# Patient Record
Sex: FEMALE | Race: WHITE | NOT HISPANIC OR LATINO | Employment: FULL TIME | URBAN - METROPOLITAN AREA
[De-identification: names, ages, dates, MRNs, and addresses within clinical notes are randomized per-mention and may not be internally consistent; named-entity substitution may affect disease eponyms.]

---

## 2018-11-08 ENCOUNTER — APPOINTMENT (OUTPATIENT)
Dept: LAB | Facility: HOSPITAL | Age: 58
End: 2018-11-08
Payer: COMMERCIAL

## 2018-11-08 ENCOUNTER — TRANSCRIBE ORDERS (OUTPATIENT)
Dept: ADMINISTRATIVE | Facility: HOSPITAL | Age: 58
End: 2018-11-08

## 2018-11-08 ENCOUNTER — HOSPITAL ENCOUNTER (OUTPATIENT)
Dept: NON INVASIVE DIAGNOSTICS | Facility: HOSPITAL | Age: 58
Discharge: HOME | End: 2018-11-08
Payer: COMMERCIAL

## 2018-11-08 DIAGNOSIS — R35.0 FREQUENT URINATION: Primary | ICD-10-CM

## 2018-11-08 DIAGNOSIS — R35.0 FREQUENT URINATION: ICD-10-CM

## 2018-11-08 LAB
ANION GAP SERPL CALCULATED.3IONS-SCNC: 6 MMOL/L (ref 4–13)
ATRIAL RATE: 67 BPM
BUN SERPL-MCNC: 13 MG/DL (ref 7–25)
CALCIUM SERPL-MCNC: 9.5 MG/DL (ref 8.6–10.5)
CHLORIDE SERPL-SCNC: 100 MMOL/L (ref 98–107)
CO2 SERPL-SCNC: 28 MMOL/L (ref 21–31)
CREAT SERPL-MCNC: 0.73 MG/DL (ref 0.6–1.2)
ERYTHROCYTE [DISTWIDTH] IN BLOOD BY AUTOMATED COUNT: 12.8 % (ref 11.5–14.5)
GFR SERPL CREATININE-BSD FRML MDRD: 90 ML/MIN/1.73SQ M
GLUCOSE SERPL-MCNC: 97 MG/DL (ref 65–99)
HCT VFR BLD AUTO: 37.3 % (ref 34.8–46.1)
HGB BLD-MCNC: 12.8 G/DL (ref 12–16)
MCH RBC QN AUTO: 30.4 PG (ref 26–34)
MCHC RBC AUTO-ENTMCNC: 34.4 G/DL (ref 31–37)
MCV RBC AUTO: 88 FL (ref 81–99)
P AXIS: 59 DEGREES
PLATELET # BLD AUTO: 279 THOUSANDS/UL (ref 149–390)
PMV BLD AUTO: 8.4 FL (ref 8.6–11.7)
POTASSIUM SERPL-SCNC: 3.8 MMOL/L (ref 3.5–5.5)
PR INTERVAL: 148 MS
QRS AXIS: 28 DEGREES
QRSD INTERVAL: 80 MS
QT INTERVAL: 380 MS
QTC INTERVAL: 401 MS
RBC # BLD AUTO: 4.22 MILLION/UL (ref 3.9–5.2)
SODIUM SERPL-SCNC: 134 MMOL/L (ref 134–143)
T WAVE AXIS: 41 DEGREES
VENTRICULAR RATE: 67 BPM
WBC # BLD AUTO: 6 THOUSAND/UL (ref 4.8–10.8)

## 2018-11-08 PROCEDURE — 93010 ELECTROCARDIOGRAM REPORT: CPT | Performed by: INTERNAL MEDICINE

## 2018-11-08 PROCEDURE — 93005 ELECTROCARDIOGRAM TRACING: CPT

## 2018-11-08 PROCEDURE — 85027 COMPLETE CBC AUTOMATED: CPT

## 2018-11-08 PROCEDURE — 36415 COLL VENOUS BLD VENIPUNCTURE: CPT

## 2018-11-08 PROCEDURE — 80048 BASIC METABOLIC PNL TOTAL CA: CPT

## 2018-12-01 ENCOUNTER — ANESTHESIA EVENT (OUTPATIENT)
Dept: PERIOP | Facility: HOSPITAL | Age: 58
End: 2018-12-01
Payer: COMMERCIAL

## 2018-12-03 ENCOUNTER — ANESTHESIA (OUTPATIENT)
Dept: PERIOP | Facility: HOSPITAL | Age: 58
End: 2018-12-03
Payer: COMMERCIAL

## 2018-12-03 ENCOUNTER — HOSPITAL ENCOUNTER (OUTPATIENT)
Facility: HOSPITAL | Age: 58
Setting detail: OUTPATIENT SURGERY
Discharge: HOME | End: 2018-12-04
Attending: OBSTETRICS & GYNECOLOGY | Admitting: OBSTETRICS & GYNECOLOGY
Payer: COMMERCIAL

## 2018-12-03 DIAGNOSIS — N81.2 INCOMPLETE UTEROVAGINAL PROLAPSE: Primary | ICD-10-CM

## 2018-12-03 DIAGNOSIS — N39.3 SUI (STRESS URINARY INCONTINENCE, FEMALE): ICD-10-CM

## 2018-12-03 PROCEDURE — C2631 REP DEV, URINARY, W/O SLING: HCPCS | Performed by: OBSTETRICS & GYNECOLOGY

## 2018-12-03 PROCEDURE — C1781 MESH (IMPLANTABLE): HCPCS | Performed by: OBSTETRICS & GYNECOLOGY

## 2018-12-03 PROCEDURE — C1771 REP DEV, URINARY, W/SLING: HCPCS | Performed by: OBSTETRICS & GYNECOLOGY

## 2018-12-03 DEVICE — IMPLANTABLE DEVICE
Type: IMPLANTABLE DEVICE | Site: VAGINA | Status: FUNCTIONAL
Brand: ANCHORSURE

## 2018-12-03 DEVICE — DIRECT FIX™ ANTERIOR POLYPROPYLENE MESH
Type: IMPLANTABLE DEVICE | Site: VAGINA | Status: FUNCTIONAL
Brand: RESTORELLE

## 2018-12-03 DEVICE — IMPLANTABLE DEVICE: Type: IMPLANTABLE DEVICE | Site: VAGINA | Status: FUNCTIONAL

## 2018-12-03 RX ORDER — ACETAMINOPHEN 325 MG/1
650 TABLET ORAL EVERY 6 HOURS
Status: DISCONTINUED | OUTPATIENT
Start: 2018-12-03 | End: 2018-12-04 | Stop reason: HOSPADM

## 2018-12-03 RX ORDER — IBUPROFEN 600 MG/1
600 TABLET ORAL EVERY 6 HOURS SCHEDULED
Status: DISCONTINUED | OUTPATIENT
Start: 2018-12-03 | End: 2018-12-04 | Stop reason: HOSPADM

## 2018-12-03 RX ORDER — CEFAZOLIN SODIUM 2 G/50ML
2000 SOLUTION INTRAVENOUS
Status: DISCONTINUED | OUTPATIENT
Start: 2018-12-03 | End: 2018-12-03 | Stop reason: HOSPADM

## 2018-12-03 RX ORDER — ONDANSETRON 2 MG/ML
INJECTION INTRAMUSCULAR; INTRAVENOUS AS NEEDED
Status: DISCONTINUED | OUTPATIENT
Start: 2018-12-03 | End: 2018-12-03 | Stop reason: SURG

## 2018-12-03 RX ORDER — BUPIVACAINE HYDROCHLORIDE AND EPINEPHRINE 5; 5 MG/ML; UG/ML
INJECTION, SOLUTION PERINEURAL AS NEEDED
Status: DISCONTINUED | OUTPATIENT
Start: 2018-12-03 | End: 2018-12-03 | Stop reason: HOSPADM

## 2018-12-03 RX ORDER — ONDANSETRON 2 MG/ML
4 INJECTION INTRAMUSCULAR; INTRAVENOUS EVERY 6 HOURS PRN
Status: DISCONTINUED | OUTPATIENT
Start: 2018-12-03 | End: 2018-12-03 | Stop reason: HOSPADM

## 2018-12-03 RX ORDER — MIDAZOLAM HYDROCHLORIDE 1 MG/ML
INJECTION INTRAMUSCULAR; INTRAVENOUS AS NEEDED
Status: DISCONTINUED | OUTPATIENT
Start: 2018-12-03 | End: 2018-12-03 | Stop reason: SURG

## 2018-12-03 RX ORDER — FENTANYL CITRATE 50 UG/ML
INJECTION, SOLUTION INTRAMUSCULAR; INTRAVENOUS AS NEEDED
Status: DISCONTINUED | OUTPATIENT
Start: 2018-12-03 | End: 2018-12-03 | Stop reason: SURG

## 2018-12-03 RX ORDER — LIDOCAINE HYDROCHLORIDE AND EPINEPHRINE 20; 5 MG/ML; UG/ML
INJECTION, SOLUTION EPIDURAL; INFILTRATION; INTRACAUDAL; PERINEURAL AS NEEDED
Status: DISCONTINUED | OUTPATIENT
Start: 2018-12-03 | End: 2018-12-03 | Stop reason: SURG

## 2018-12-03 RX ORDER — MAGNESIUM HYDROXIDE 1200 MG/15ML
LIQUID ORAL AS NEEDED
Status: DISCONTINUED | OUTPATIENT
Start: 2018-12-03 | End: 2018-12-03 | Stop reason: HOSPADM

## 2018-12-03 RX ORDER — DOCUSATE SODIUM 100 MG/1
100 CAPSULE, LIQUID FILLED ORAL 2 TIMES DAILY
Status: DISCONTINUED | OUTPATIENT
Start: 2018-12-03 | End: 2018-12-04 | Stop reason: HOSPADM

## 2018-12-03 RX ORDER — SODIUM CHLORIDE 9 MG/ML
75 INJECTION, SOLUTION INTRAVENOUS CONTINUOUS
Status: DISCONTINUED | OUTPATIENT
Start: 2018-12-03 | End: 2018-12-04 | Stop reason: HOSPADM

## 2018-12-03 RX ORDER — PROPOFOL 10 MG/ML
INJECTION, EMULSION INTRAVENOUS CONTINUOUS PRN
Status: DISCONTINUED | OUTPATIENT
Start: 2018-12-03 | End: 2018-12-03 | Stop reason: SURG

## 2018-12-03 RX ORDER — SODIUM CHLORIDE 9 MG/ML
125 INJECTION, SOLUTION INTRAVENOUS CONTINUOUS
Status: DISCONTINUED | OUTPATIENT
Start: 2018-12-03 | End: 2018-12-04 | Stop reason: HOSPADM

## 2018-12-03 RX ORDER — PANTOPRAZOLE SODIUM 40 MG/1
40 TABLET, DELAYED RELEASE ORAL
Status: DISCONTINUED | OUTPATIENT
Start: 2018-12-04 | End: 2018-12-04 | Stop reason: HOSPADM

## 2018-12-03 RX ORDER — ONDANSETRON 2 MG/ML
4 INJECTION INTRAMUSCULAR; INTRAVENOUS EVERY 6 HOURS PRN
Status: DISCONTINUED | OUTPATIENT
Start: 2018-12-03 | End: 2018-12-04 | Stop reason: HOSPADM

## 2018-12-03 RX ORDER — FENTANYL CITRATE 50 UG/ML
50 INJECTION, SOLUTION INTRAMUSCULAR; INTRAVENOUS
Status: DISCONTINUED | OUTPATIENT
Start: 2018-12-03 | End: 2018-12-03 | Stop reason: HOSPADM

## 2018-12-03 RX ORDER — PROPOFOL 10 MG/ML
INJECTION, EMULSION INTRAVENOUS AS NEEDED
Status: DISCONTINUED | OUTPATIENT
Start: 2018-12-03 | End: 2018-12-03 | Stop reason: SURG

## 2018-12-03 RX ORDER — FUROSEMIDE 10 MG/ML
INJECTION INTRAMUSCULAR; INTRAVENOUS AS NEEDED
Status: DISCONTINUED | OUTPATIENT
Start: 2018-12-03 | End: 2018-12-03 | Stop reason: SURG

## 2018-12-03 RX ADMIN — CEFAZOLIN SODIUM 2000 MG: 2 SOLUTION INTRAVENOUS at 12:10

## 2018-12-03 RX ADMIN — ONDANSETRON HYDROCHLORIDE 4 MG: 2 INJECTION, SOLUTION INTRAVENOUS at 12:05

## 2018-12-03 RX ADMIN — SODIUM CHLORIDE 125 ML/HR: 0.9 INJECTION, SOLUTION INTRAVENOUS at 08:44

## 2018-12-03 RX ADMIN — LIDOCAINE HYDROCHLORIDE AND EPINEPHRINE 5 ML: 20; 5 INJECTION, SOLUTION EPIDURAL; INFILTRATION; INTRACAUDAL; PERINEURAL at 11:40

## 2018-12-03 RX ADMIN — FUROSEMIDE 10 MG: 10 INJECTION, SOLUTION INTRAMUSCULAR; INTRAVENOUS at 13:58

## 2018-12-03 RX ADMIN — LIDOCAINE HYDROCHLORIDE AND EPINEPHRINE 5 ML: 20; 5 INJECTION, SOLUTION EPIDURAL; INFILTRATION; INTRACAUDAL; PERINEURAL at 13:40

## 2018-12-03 RX ADMIN — SODIUM CHLORIDE 125 ML/HR: 0.9 INJECTION, SOLUTION INTRAVENOUS at 11:25

## 2018-12-03 RX ADMIN — IBUPROFEN 600 MG: 600 TABLET ORAL at 23:57

## 2018-12-03 RX ADMIN — LIDOCAINE HYDROCHLORIDE AND EPINEPHRINE 5 ML: 20; 5 INJECTION, SOLUTION EPIDURAL; INFILTRATION; INTRACAUDAL; PERINEURAL at 11:37

## 2018-12-03 RX ADMIN — SODIUM CHLORIDE 75 ML/HR: 0.9 INJECTION, SOLUTION INTRAVENOUS at 23:59

## 2018-12-03 RX ADMIN — SODIUM CHLORIDE: 0.9 INJECTION, SOLUTION INTRAVENOUS at 12:54

## 2018-12-03 RX ADMIN — ACETAMINOPHEN 650 MG: 325 TABLET, FILM COATED ORAL at 22:19

## 2018-12-03 RX ADMIN — PROPOFOL 80 MCG/KG/MIN: 10 INJECTION, EMULSION INTRAVENOUS at 12:10

## 2018-12-03 RX ADMIN — SODIUM CHLORIDE: 0.9 INJECTION, SOLUTION INTRAVENOUS at 14:36

## 2018-12-03 RX ADMIN — MIDAZOLAM 2 MG: 1 INJECTION INTRAMUSCULAR; INTRAVENOUS at 11:30

## 2018-12-03 RX ADMIN — SODIUM CHLORIDE 75 ML/HR: 0.9 INJECTION, SOLUTION INTRAVENOUS at 16:14

## 2018-12-03 RX ADMIN — DOCUSATE SODIUM 100 MG: 100 CAPSULE, LIQUID FILLED ORAL at 17:59

## 2018-12-03 RX ADMIN — FENTANYL CITRATE 100 MCG: 50 INJECTION, SOLUTION INTRAMUSCULAR; INTRAVENOUS at 11:30

## 2018-12-03 RX ADMIN — ACETAMINOPHEN 650 MG: 325 TABLET, FILM COATED ORAL at 16:09

## 2018-12-03 RX ADMIN — IBUPROFEN 600 MG: 600 TABLET ORAL at 18:00

## 2018-12-03 RX ADMIN — PROPOFOL 100 MG: 10 INJECTION, EMULSION INTRAVENOUS at 14:08

## 2018-12-03 RX ADMIN — LIDOCAINE HYDROCHLORIDE AND EPINEPHRINE 3 ML: 20; 5 INJECTION, SOLUTION EPIDURAL; INFILTRATION; INTRACAUDAL; PERINEURAL at 11:33

## 2018-12-03 NOTE — ANESTHESIA PROCEDURE NOTES
Epidural Block    Patient location during procedure: pre-op  Start time: 12/3/2018 11:30 AM  Reason for block: at surgeon's request and primary anesthetic  Staffing  Anesthesiologist: Lisbeth Gonzalez  Performed: anesthesiologist   Preanesthetic Checklist  Completed: patient identified, site marked, surgical consent, pre-op evaluation, timeout performed, IV checked, risks and benefits discussed and monitors and equipment checked  Epidural  Patient position: sitting  Prep: Betadine  Patient monitoring: heart rate, continuous pulse ox and frequent blood pressure checks  Location: lumbar (1-5)  Injection technique: JUAN PABLO saline  Needle  Needle type: Tuohy   Needle gauge: 18 G  Catheter type: side hole  Catheter size: 20 G  Test dose: negative and lidocaine 1 5% with epinephrine 1-to-200,000negative aspiration for CSF, negative aspiration for heme and no paresthesia on injection  patient tolerated the procedure well with no immediate complications

## 2018-12-03 NOTE — ANESTHESIA PREPROCEDURE EVALUATION
Review of Systems/Medical History          Cardiovascular  Negative cardio ROS    Pulmonary  Negative pulmonary ROS        GI/Hepatic  Negative GI/hepatic ROS               Endo/Other  Negative endo/other ROS      GYN       Hematology  Negative hematology ROS      Musculoskeletal  Sciatica, Scoliosis (mild, no prior spine surgeries) ,        Neurology  Negative neurology ROS      Psychology   Negative psychology ROS              Physical Exam    Airway    Mallampati score: I  TM Distance: >3 FB  Neck ROM: full     Dental   upper dentures and lower dentures,     Cardiovascular  Comment: Negative ROS, Rhythm: regular, Rate: normal, Cardiovascular exam normal    Pulmonary  Pulmonary exam normal     Other Findings        Anesthesia Plan  ASA Score- 2     Anesthesia Type- epidural with ASA Monitors  Additional Monitors:   Airway Plan:         Plan Factors-    Induction- intravenous  Postoperative Plan-     Informed Consent- Anesthetic plan and risks discussed with patient

## 2018-12-03 NOTE — OP NOTE
OPERATIVE REPORT  PATIENT NAME: Regina Covington    :  1960  MRN: [id number]  Pt Location: [facility] OR ROOM 04    SURGERY DATE: 12/3/2018    Surgeon(s) and Role: * Amado Denney MD - Primary     * Lanre Maldonado MD - Resident, Lele Kruger MD - Fellow, Assisting    Preop Diagnosis:  Incomplete uterovaginal prolapse [N81 2]  Cystocele, midline [N81 11]  Rectocele [N81 6]  Incompetence or weakening of pubocervical tissue [N81 82]  Incompetence or weakening of rectovaginal tissue [N81 83]  Other female genital prolapse [N81 89]  Stress incontinence [N39 3]  Hypermobility of urethra [N36 41]    Post-Op Diagnosis Codes:     * Incomplete uterovaginal prolapse [N81 2]     * Cystocele, midline [N81 11]     * Rectocele [N81 6]     * Incompetence or weakening of pubocervical tissue [N81 82]     * Incompetence or weakening of rectovaginal tissue [N81 83]     * Other female genital prolapse [N81 89]     * Stress incontinence [N39 3]     * Hypermobility of urethra [N36 41]    Procedure(s) (LRB):  ANT COLPOPEXY, VEC (N/A)  ANT POST COLPORRHAPHY (N/A)  RETROPUBIC SLING (N/A)  CYSTO (N/A)    Specimen(s):  * No specimens in log *    Estimated Blood Loss:   Minimal    Drains:  Urethral Catheter Non-latex 18 Fr  (Active)   Number of days: 0       Anesthesia Type:   Epidural    Operative Indications:  Incomplete uterovaginal prolapse [N81 2]  Cystocele, midline [N81 11]  Rectocele [N81 6]  Incompetence or weakening of pubocervical tissue [N81 82]  Incompetence or weakening of rectovaginal tissue [N81 83]  Other female genital prolapse [N81 89]  Stress incontinence [N39 3]  Hypermobility of urethra [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]    Operative Findings:  1  Stage 2 uterovaginal prolapse  2  Cystoscopy: efflux noted from bilateral ureteral orifices  No mesh, suture material, or injury noted to bladder lumen      Complications:   None    Procedure and Technique:  Appropriate preoperative antibiotics chosen per ACOG guidelines were given  Bilateral SCDs were placed in the lower extremities for DVT prevention prior to the institution of anesthesia  No bladder, ureteral, viscus, or solid organ injury were noted at the end of the procedure  The patient was identified in the holding area by the operating room staff and attending physician  She was taken to the operating room where anesthesia was instituted without complications  She was placed in the dorsal lithotomy position with the legs in [address] [place] [address] with care taken to avoid excessive flexion or extension of her lower extremities  The patient was prepped and draped in the usual sterile fashion  A Rosales catheter was inserted  Attention was turned to the anterior vaginal extraperitoneal colpopexy and the anterior colporrhaphy  Two Allis clamps were applied vertically along the midline to grasp the dependent portion of the cystocele for traction  0 25% Marcaine with epinephrine diluted 1:1 with injectable saline was infiltrated into the true vesicovaginal space using a Tuohey epidural needle for hydrodissection  20ml were injected into the midline, 20ml injected into the left and 20ml injected into the right paravaginal spaces for a total of 60ml  Next, a midline anterior vaginal wall incision was made through the full thickness of the the vaginal epithelium, the muscularis and the pubocervical fascia to the vesicovaginal space  This incision was extended to the level of the urethrovesical junction distally and the cervix proximally  Careful lateral dissection was carried out until the paravaginal and paravesical spaces were reached, allowing palpation of the obturator internus muscle, ischial spines, and arcus tendineus, and sacrospinous ligament  The bladder was drained, draining clear urine  The fascia endopelvina was entered by using a Tuohey epidural needle to puncture the obturator membrane at the level of the superior medial notch   There was noted to be brisk dark red bleeding from the right dissection tract at this time, and 10 mL of Floseal was applied with 7 minutes of pressure  This resulted in hemostasis  Next, the Anchorsure trocar device loaded with a 2-0 Prolene suture was brought onto the field  It was inserted through the left paravaginal and paravesical space towards the level of the sacrospinous ligament  The device was used to place the 2-0 Prolene suture through the sacrospinous ligament approximately 1 5 cm medial to the ischial spine  Once the suture was anchored into place, the trocar was removed  The same procedure was repeated on the contralateral side  Next, the Anchosure device was used to place 2-0 Prolenes suture at the level of the arcus insertion into the pubic ramus on both sides  Next, tacking sutures were placed: a 2-0 PDS suture was distally at the urethrovesical junction and two 2-0 Prolene sutures were placed at the level of the cervix  The Restorelle mesh was brought onto the field and trimmed fit the dimensions of the anterior vaginal wall  It was secured to the vaginal epithelium with the previously placed tacking sutures  The sacrospinous sutures were passed through the proximal arms of the mesh and the arcus tendineus sutures were passed through the distal arms of the mesh  The sutures were tied down to complete the colpopexy and appropriate reduction of the prolapse was noted  The mesh and paravaginal spaces were copiously irrigated with an antibiotic solution  The vaginal epithelium was closed using 2-0 Vicryl suture in a running locked fashion with care taken to incorporate a full-thickness closure  The incision was hemostatic  Attention was then turned to the posterior compartment where two Allis clamps were placed in the posterior fourchette over the mucocutaneous border to reduce the markedly relaxed vaginal outlet  Dilute 0 25% Marcaine solution with epinephrine was injected into the perineum   A lolita-shaped incision was made extending from the vaginal mucosa onto the perineum  The overlying scarred vaginal epithelium and perineal skin was removed en bloc with the Bovie device with excellent hemostasis noted throughout  The posterior colporrhaphy was closed with a 2-0 Vicryl suture in a running locked stitch  We reapproximated the perineal body with a 0-Vicryl interrupted suture  The remainder of the colporrhaphy was closed to the level of the hymen  The perineal skin was closed with with 2-0 Vicryl in a subcuticular fashion  Next, the retropubic sling procedure was performed  Two areas on the anterior abdominal wall at the level of pubic bone 2 fingerbreadths above and lateral to the midline on the pubic bone, were identified and two skin marks were placed  Local anesthetic solution was delivered at these sites infiltrating the muscle, fascia, and subdermal levels  We then turned our attention to the anterior vaginal wall  The mid urethral level was identified by reference to the Rosales bulb at the external meatus  Local anesthetic solution was injected into the anterior vaginal wall at the midurethral level for hydrodissection and vasoconstriction  Then, 10 mL were injected at the midline and an additional 7 mL were injected to the left and right of midline directing the infiltration laterally towards the ipsilateral mid-axillary line under the pubic symphysis  After completion of hydrodissection, 2 Allis clamps were used to grasp the anterior vaginal wall for traction, and a 1 3-cm incision was made at the midline incising epithelium and muscularis layers  Two Allis clamps were then placed on each cut edge of the incision for stabilization  Tenotomy scissors were used to create a small vaginal tunnel with sharp and blunt dissection in the anterior vaginal wall directing the dissection lateral to the midline, going towards the underside of the pubic bone   Dissection was carried out to the edge of the pubic bone itself, and we did this bilaterally  Once the dissection was complete, the Rosales was drained  The rigid stylette was placed through the Rosales  This will be used for deflection of the bladder, and urethra out of the anticipated path of the trocar  We deviated the bladder to the left side of the patient, and we passed an Advantage sling via the TVT Exact introducer into the pre-dissected tract on the right side of the patient  Once we pierced the pubocervical fascia and passed the bone, we directed our trajectory of the trocar ventral towards the anterior abdominal wall towards the pre-marked skin on the right side the patient maintaining contact with the pubic symphysis  Once we were at the level of the skin, we incised the skin at the alfonso with a scalpel, and the trocar was passed through that incision  At this time, this was held with ring forceps leaving the trocar in place  We drained the bladder and removed the Rosales and we performed a cystoscopy by instilling 200 mL of fluid inside the bladder  There was no evidence of any punctures, lacerations, or injuries to the bladder  We reinserted the Rosales catheter to again drain the bladder  We removed the introducer on the right side and pulled the blue plastic cover of the sling with a Shakira clamp until the mesh came out the abdominal incision and secured it at the level of the mesh with the [employer]  We cut the blue plastic cover above the Shakira clamp  We repeated the same sequence of steps for the placement of the left side  Once this was done bilaterally, cystoscopy was performed again  We did not see any laceration on the left side and good efflux was noted bilaterally from the ureteral orifices  We retrograde filled the bladder with 300 mL and removed the cystoscope   Once the sling was tensioned properly, we removed the plastic sleeve surrounding the sling arms by gently pulling them up through the skin incisions bilaterally with a Shakira clamp as counter traction was placed at the midurethral level to avoid any further tightening  The sling arms were trimmed to the level of the skin  The incision in the vaginal mucosa was closed with 2-0 Vicryl suture in a running locked stitch  Good hemostasis was achieved  The skin incisions were closed with Histoacryl  The patient tolerated the procedure well  The Rosales catheter was reinserted  Vaginal packing was placed in the vagina  The sponge, needle and instrument count were correct x 2  The patient tolerated the procedure well  She was awakened from anesthesia and transferred to the recovery room in stable condition  A qualified resident physician was not available to assist  Dr Thanh Olivares was present for the entire procedure      Patient Disposition:  PACU     SIGNATURE: Filiberto Mccormick MD  DATE: December 3, 2018  TIME: 2:37 PM

## 2018-12-03 NOTE — ANESTHESIA POSTPROCEDURE EVALUATION
Post-Op Assessment Note      CV Status:  Stable    Mental Status:  Alert and awake    Hydration Status:  Euvolemic    PONV Controlled:  Controlled    Airway Patency:  Patent    Post Op Vitals Reviewed: Yes          Staff: Anesthesiologist     Post-op block assessment: catheter intact and no complications        BP      Temp     Pulse     Resp      SpO2

## 2018-12-03 NOTE — DISCHARGE INSTRUCTIONS
REMOVE YOUR VAGINAL PACKING IN 3 DAYS = THURS 12/6/18    Anterior Vaginal Repair   WHAT YOU NEED TO KNOW:   An anterior vaginal repair is a procedure to lift or tighten the front vaginal wall  This can help prevent you from leaking urine  The procedure is also called an anterior colporrhaphy  DISCHARGE INSTRUCTIONS:   Medicines:   · Pain medicine: You may need medicine to take away or decrease pain  ¨ Learn how to take your medicine  Ask what medicine and how much you should take  Be sure you know how, when, and how often to take it  ¨ Do not wait until the pain is severe before you take your medicine  Tell caregivers if your pain does not decrease  ¨ Pain medicine can make you dizzy or sleepy  Prevent falls by calling someone when you get out of bed or if you need help  · Antibiotics: This medicine is given to fight or prevent an infection caused by bacteria  Always take your antibiotics exactly as ordered by your healthcare provider  Do not stop taking your medicine unless directed by your healthcare provider  Never save antibiotics or take leftover antibiotics that were given to you for another illness  · Take your medicine as directed  Contact your healthcare provider if you think your medicine is not helping or if you have side effects  Tell him or her if you are allergic to any medicine  Keep a list of the medicines, vitamins, and herbs you take  Include the amounts, and when and why you take them  Bring the list or the pill bottles to follow-up visits  Carry your medicine list with you in case of an emergency  Follow up with your healthcare provider as directed:  Write down your questions so you remember to ask them during your visits  Self-care:   · Sex:  Do not have sex until your healthcare provider says it is okay  · Kegel exercises: To do kegel exercises, squeeze your pelvic floor muscles for 5 to 10 seconds, then release   Regular kegel exercises will help your pelvic floor muscles become stronger  This will help prevent you from leaking urine  Ask your healthcare provider when to start these exercises and how often to do them  · Sanitary pad:  Change your sanitary pad regularly  Keep track of how often you change the pad  · Rosales catheter:  Keep the bag below your waist  This will help prevent infection and other problems caused by urine flowing back into your bladder  Do not pull on the catheter because this can cause pain and bleeding, and the catheter could come out  Keep the catheter tubing free of kinks so your urine will flow into the bag  Your healthcare provider will remove the catheter as soon as possible, to help prevent infection  · Wound care:  When you are allowed to bathe or shower, carefully wash your vaginal area with soap and water  · Do not put pressure on your abdomen: This will help prevent damage to your surgery area  Do not strain, lift heavy objects, or stand for a very long time  Do not perform strenuous exercises, such as running and weight lifting  · Activity:  You may need to start walking within a few days after your procedure  Ask your healthcare provider when to start and how long you should walk  Ask about any other exercises that may be right for you  · Support socks: You may need to wear support socks  These are tight socks that help increase the circulation in your legs until you are more active  This helps prevent blood clots  Contact your healthcare provider if:   · You soak a sanitary pad with blood every hour for 4 hours  · You have vaginal pain that does not go away even after you take pain medicine  · You have pus or a foul-smelling discharge from your genital area  · You see blood in your urine  · You have pain during sex  · You have a fever, chills, a cough, or feel weak and achy  · You have nausea and vomiting  · You have questions or concerns about your condition or care    Seek care immediately or call 911 if:   · You feel something is bulging out into your vagina or rectum and not going back in     · You cannot urinate  · Your arm or leg feels warm, tender, and painful  It may look swollen and red  · You suddenly feel lightheaded and short of breath  · You have chest pain  You may have more pain when you take a deep breath or cough  You may cough up blood  © 2017 [facility] Information is for End User's use only and may not be sold, redistributed or otherwise used for commercial purposes  All illustrations and images included in CareNotes® are the copyrighted property of [employer] D [employer] M [employer] Inc  or [employer]  The above information is an  only  It is not intended as medical advice for individual conditions or treatments  Talk to your doctor, nurse or pharmacist before following any medical regimen to see if it is safe and effective for you  Bladder Sling Procedure   WHAT YOU NEED TO KNOW:   A bladder sling procedure is surgery to treat urinary incontinence in women  The sling acts as a hammock to keep your urethra in place and hold it closed when your bladder is full  You may have vaginal bleeding or discharge for up to a week after your surgery  Use sanitary pads  Do not use tampons  You may have some pelvic discomfort or trouble urinating  DISCHARGE INSTRUCTIONS:   Call 911 for any of the following:   · You have sudden trouble breathing  Seek care immediately if:   · Your bleeding gets worse  · You have yellow or foul smelling discharge from your vagina  · You cannot urinate, or you are urinating less than what is normal for you  · You feel confused  Contact your healthcare provider if:   · You have a fever  · You do not feel like you are able to empty your bladder completely when you urinate  · You feel the need to urinate very suddenly  · You have burning or stinging when you urinate  · You have blood in your urine      · Your skin is itchy, swollen, or you have a rash  · You have questions or concerns about your condition or care  Medicines:   · Prescription pain medicine  may be given  Ask your how to take this medicine safely  · Take your medicine as directed  Contact your healthcare provider if you think your medicine is not helping or if you have side effects  Tell him or her if you are allergic to any medicine  Keep a list of the medicines, vitamins, and herbs you take  Include the amounts, and when and why you take them  Bring the list or the pill bottles to follow-up visits  Carry your medicine list with you in case of an emergency  Self-catheterization:  You may need to put a catheter into your bladder after you urinate to empty any remaining urine  A catheter is a small rubber tube used to drain urine  Healthcare providers will teach you how to put the catheter in safely  This may be needed until you are completely emptying your bladder  Rosales catheter: You may have a Rosales catheter for a short period of time  The Rosales is a tube put into your bladder to drain urine into a bag  Keep the bag below your waist  This will prevent urine from flowing back into your bladder and causing an infection or other problems  Also, keep the tube free of kinks so the urine will drain properly  Do not pull on the catheter  This can cause pain and bleeding, and may cause the catheter to come out  Activity:  Do not lift heavy objects for 6 weeks after your procedure  Do not have intercourse for 4 to 6 weeks  Do not use a tampon for 4 weeks  Ask your healthcare provider when you can return to work or your usual activities  Do pelvic muscle exercises: These are also called Kegel exercises  These exercises help strengthen your pelvic muscles and help prevent urine leakage  Tighten the muscles of your pelvis and hold them tight for 5 seconds, then relax for 5 seconds   Gradually work up to tightening them for 10 seconds and relaxing for 10 seconds  Do this 3 times each day  Keep a record:  Keep a record of when you urinate and if you leak any urine  Write down what you were doing when you leaked urine, such as coughing or sneezing  Bring the log to your follow-up visits  Prevent constipation:  Drink liquids as directed  You may need to drink more water than usual to soften your bowel movements  Eat a variety of healthy foods, especially fruit and foods high in fiber  You may need to use an over-the-counter bowel movement softener  Follow up with your healthcare provider as directed: You may need a test to check how much urine remains in your bladder after you urinate  This will help show how the sling is working  Write down your questions so you remember to ask them during your visits  © 2017 [address] [employer] Information is for End User's use only and may not be sold, redistributed or otherwise used for commercial purposes  All illustrations and images included in CareNotes® are the copyrighted property of A D [employer] M , Inc  or [employer]igar [employer]  The above information is an  only  It is not intended as medical advice for individual conditions or treatments  Talk to your doctor, nurse or pharmacist before following any medical regimen to see if it is safe and effective for you

## 2018-12-04 VITALS
BODY MASS INDEX: 30.23 KG/M2 | HEART RATE: 74 BPM | TEMPERATURE: 98 F | DIASTOLIC BLOOD PRESSURE: 60 MMHG | SYSTOLIC BLOOD PRESSURE: 99 MMHG | WEIGHT: 154 LBS | RESPIRATION RATE: 18 BRPM | OXYGEN SATURATION: 99 % | HEIGHT: 60 IN

## 2018-12-04 RX ORDER — IBUPROFEN 600 MG/1
600 TABLET ORAL EVERY 6 HOURS PRN
Qty: 30 TABLET | Refills: 0
Start: 2018-12-04

## 2018-12-04 RX ORDER — ACETAMINOPHEN 500 MG
500 TABLET ORAL EVERY 6 HOURS PRN
Qty: 30 TABLET | Refills: 0
Start: 2018-12-04

## 2018-12-04 RX ADMIN — PANTOPRAZOLE SODIUM 40 MG: 40 TABLET, DELAYED RELEASE ORAL at 06:08

## 2018-12-04 RX ADMIN — ACETAMINOPHEN 650 MG: 325 TABLET, FILM COATED ORAL at 04:18

## 2018-12-04 RX ADMIN — IBUPROFEN 600 MG: 600 TABLET ORAL at 06:08

## 2018-12-04 RX ADMIN — ACETAMINOPHEN 650 MG: 325 TABLET, FILM COATED ORAL at 09:32

## 2018-12-04 RX ADMIN — DOCUSATE SODIUM 100 MG: 100 CAPSULE, LIQUID FILLED ORAL at 09:32

## 2018-12-04 NOTE — PROGRESS NOTES
Post Op Check  Rodney Davenport 59 y o  female MRN: [id number]  Unit/Bed#: L&D 313-01 Encounter: [id number]    Subjective: Patient is resting comfortably in bed  She states pain is well controlled  She has been tolerating PO without difficulty  She currently still has her montalvo  She has ambulated  She has not passed flatus      /69 (BP Location: Right arm)   Pulse 87   Temp 98 1 °F (36 7 °C) (Oral)   Resp 14   Ht 5' (1 524 m)   Wt 69 9 kg (154 lb)   SpO2 96%   BMI 30 08 kg/m²     I/O this shift:  In: -   Out: 900 [Urine:900]    Lab Results   Component Value Date    WBC 6 00 11/08/2018    HGB 12 8 11/08/2018    HCT 37 3 11/08/2018    MCV 88 11/08/2018     11/08/2018       Lab Results   Component Value Date    CALCIUM 9 5 11/08/2018    K 3 8 11/08/2018    CO2 28 11/08/2018     11/08/2018    BUN 13 11/08/2018    CREATININE 0 73 11/08/2018       Physical Exam  Gen: resting comfortably  CVS: RRR  Lungs: CTAB  Abdomen:soft, nontender, nondistended  Extremities: nontender, SCDs    A/P: 60 yo w/ POP and UI scheduled for anterior VEC, colporrhaphy, retropublic sling and cysto  1) Pain: motrin/tylenol  2) Montalvo: D/c tomorrow morning, packing for 3 days  3) Diet: regular, 75cc/hr  4) DVT PPx: SCDs  5) Encouraged incentive spirometry to reduce atelectasis and pneumonia risk  6) Encouraged ambulation as tolerated    Javier Hernandez  12/3/2018  11:07 PM

## 2018-12-04 NOTE — PROGRESS NOTES
Progress Note - UROJANIA Ogden 59 y o  female MRN: [id number]  Unit/Bed#: L&D 313-01 Encounter: [id number]    Assessment / Plan:  59 y o  female with pelvic organ prolapse and stress urinary incontinence status post anterior VEC, posterior colporrhaphy, retropubic sling & cystoscopy POD#1 doing well postoperatively    #1 POP, SERENA  Status post surgery as mentioned above    #2 Post operative Care: Rosales out this AM, follow up voiding trial  Vaginal packing x 3 days, patient aware to remove at home on Thursday 12/6  - FEN: Regular, discontinue IVF  - Pain: Tylenol, Motrin PRN  - DVT ppx: SCDs  - Encouraged incentive spirometry to reduce atelectasis and pneumonia risk  - Encouraged ambulation as tolerated    #3 Disposition  Anticipate discharge home today    Subjective/Objective     Subjective:     No acute events overnight  Pain is well controlled with PO medications  She is tolerating PO regular diet  She denies nausea or vomiting  She is not yet passing flatus and has not yet had a bowel movement  She is ambulating at baseline  She denies chest pain, shortness of breath, calf pain, fever/chills  Objective:     Vitals: Blood pressure 101/60, pulse 70, temperature 98 2 °F (36 8 °C), temperature source Oral, resp  rate 16, height 5' (1 524 m), weight 69 9 kg (154 lb), SpO2 97 %  Physical Exam:     Physical Exam   Constitutional: She is oriented to person, place, and time  She appears well-developed and well-nourished  Cardiovascular: Normal rate and regular rhythm  Pulmonary/Chest: Effort normal and breath sounds normal  No respiratory distress  Abdominal: Soft  She exhibits no distension  There is no tenderness  There is no rebound and no guarding  Retropubic incisions clean/dry/intact with Histoacryl   Genitourinary:   Genitourinary Comments: Rosales out  Vaginal packing in place   Neurological: She is alert and oriented to person, place, and time  Skin: Skin is warm and dry     Psychiatric: She has a normal mood and affect  Lab, Imaging and other studies: I have personally reviewed pertinent reports        Lab Results   Component Value Date    WBC 6 00 11/08/2018    HGB 12 8 11/08/2018    HCT 37 3 11/08/2018    MCV 88 11/08/2018     11/08/2018         Vicente Sneed MD PGY3  12/04/18

## 2018-12-04 NOTE — PLAN OF CARE
INFECTION - ADULT     Absence or prevention of progression during hospitalization Completed        Knowledge Deficit     Patient/family/caregiver demonstrates understanding of disease process, treatment plan, medications, and discharge instructions Completed        PAIN - ADULT     Verbalizes/displays adequate comfort level or baseline comfort level Completed        SAFETY ADULT     Patient will remain free of falls Completed     Maintain or return to baseline ADL function Completed     Maintain or return mobility status to optimal level Completed

## 2021-01-15 DIAGNOSIS — Z20.828 EXPOSURE TO SARS-ASSOCIATED CORONAVIRUS: ICD-10-CM

## 2021-01-15 PROCEDURE — U0003 INFECTIOUS AGENT DETECTION BY NUCLEIC ACID (DNA OR RNA); SEVERE ACUTE RESPIRATORY SYNDROME CORONAVIRUS 2 (SARS-COV-2) (CORONAVIRUS DISEASE [COVID-19]), AMPLIFIED PROBE TECHNIQUE, MAKING USE OF HIGH THROUGHPUT TECHNOLOGIES AS DESCRIBED BY CMS-2020-01-R: HCPCS | Performed by: FAMILY MEDICINE

## 2021-01-15 PROCEDURE — U0005 INFEC AGEN DETEC AMPLI PROBE: HCPCS | Performed by: FAMILY MEDICINE

## 2021-01-17 LAB — SARS-COV-2 RNA SPEC QL NAA+PROBE: NOT DETECTED

## 2021-04-07 DIAGNOSIS — U07.1 COVID-19: ICD-10-CM

## 2021-04-07 PROCEDURE — U0005 INFEC AGEN DETEC AMPLI PROBE: HCPCS | Performed by: FAMILY MEDICINE

## 2021-04-07 PROCEDURE — U0003 INFECTIOUS AGENT DETECTION BY NUCLEIC ACID (DNA OR RNA); SEVERE ACUTE RESPIRATORY SYNDROME CORONAVIRUS 2 (SARS-COV-2) (CORONAVIRUS DISEASE [COVID-19]), AMPLIFIED PROBE TECHNIQUE, MAKING USE OF HIGH THROUGHPUT TECHNOLOGIES AS DESCRIBED BY CMS-2020-01-R: HCPCS | Performed by: FAMILY MEDICINE

## 2021-04-08 LAB — SARS-COV-2 RNA RESP QL NAA+PROBE: NEGATIVE

## 2021-04-10 ENCOUNTER — OFFICE VISIT (OUTPATIENT)
Dept: URGENT CARE | Facility: CLINIC | Age: 61
End: 2021-04-10
Payer: COMMERCIAL

## 2021-04-10 VITALS
TEMPERATURE: 98.4 F | RESPIRATION RATE: 18 BRPM | HEIGHT: 65 IN | HEART RATE: 95 BPM | OXYGEN SATURATION: 98 % | WEIGHT: 163 LBS | BODY MASS INDEX: 27.16 KG/M2

## 2021-04-10 DIAGNOSIS — Z11.59 SPECIAL SCREENING EXAMINATION FOR UNSPECIFIED VIRAL DISEASE: Primary | ICD-10-CM

## 2021-04-10 LAB — SARS-COV-2 RNA RESP QL NAA+PROBE: POSITIVE

## 2021-04-10 PROCEDURE — 87635 SARS-COV-2 COVID-19 AMP PRB: CPT | Performed by: NURSE PRACTITIONER

## 2021-04-10 PROCEDURE — 99214 OFFICE O/P EST MOD 30 MIN: CPT | Performed by: NURSE PRACTITIONER

## 2021-04-10 RX ORDER — RIBOFLAVIN (VITAMIN B2) 100 MG
100 TABLET ORAL DAILY
COMMUNITY

## 2021-04-10 RX ORDER — ZINC GLUCONATE 50 MG
50 TABLET ORAL DAILY
COMMUNITY

## 2021-04-10 NOTE — PROGRESS NOTES
[facility]        NAME: Elias Varma is a 59 y o  female  : 1960    MRN: [id number]  DATE: April 10, 2021  TIME: 10:53 AM    Assessment and Plan   Special screening examination for unspecified viral disease [Z11 59]  1  Special screening examination for unspecified viral disease  Novel Coronavirus (Covid-19),[facility]         Patient Instructions     Patient Instructions   Patient instructed to self quarantine  Advised to avoid nsaids  If you develop prolonged high fever, worsening or productive cough, shortness of breath, difficulty breathing, chest pain, or any new or concerning symptoms please proceed ER  Instructed patient to call ER prior to arrival make them aware she is being test for covid  Patient verbalizes understanding  Start vitamin c 1g twice daily,vitamin d3 2000IU daily, and multivitamin  monitor pulse ox  Call PCP in 24 hours for f/u  [address]    Your healthcare provider and/or public health staff have evaluated you and have determined that you do not need to remain in the hospital at this time  At this time you can be isolated at home where you will be monitored by staff from your local or state health department  You should carefully follow the prevention and isolation steps below until a healthcare provider or local or state health department says that you can return to your normal activities  Stay home except to get medical care    People who are mildly ill with COVID-19 are able to isolate at home during their illness  You should restrict activities outside your home, except for getting medical care  Do not go to work, school, or public areas  Avoid using public transportation, ride-sharing, or taxis  Separate yourself from other people and animals in your home    People: As much as possible, you should stay in a specific room and away from other people in your home   Also, you should use a separate bathroom, if available  Animals: You should restrict contact with pets and other animals while you are sick with COVID-19, just like you would around other people  Although there have not been reports of pets or other animals becoming sick with COVID-19, it is still recommended that people sick with COVID-19 limit contact with animals until more information is known about the virus  When possible, have another member of your household care for your animals while you are sick  If you are sick with COVID-19, avoid contact with your pet, including petting, snuggling, being kissed or licked, and sharing food  If you must care for your pet or be around animals while you are sick, wash your hands before and after you interact with pets and wear a facemask  See COVID-19 and Animals for more information  Call ahead before visiting your doctor    If you have a medical appointment, call the healthcare provider and tell them that you have or may have COVID-19  This will help the healthcare providers office take steps to keep other people from getting infected or exposed  Wear a facemask    You should wear a facemask when you are around other people (e g , sharing a room or vehicle) or pets and before you enter a healthcare providers office  If you are not able to wear a facemask (for example, because it causes trouble breathing), then people who live with you should not stay in the same room with you, or they should wear a facemask if they enter your room  Cover your coughs and sneezes    Cover your mouth and nose with a tissue when you cough or sneeze  Throw used tissues in a lined trash can  Immediately wash your hands with soap and water for at least 20 seconds or, if soap and water are not available, clean your hands with an alcohol-based hand  that contains at least 60% alcohol      Clean your hands often    Wash your hands often with soap and water for at least 20 seconds, especially after blowing your nose, coughing, or sneezing; going to the bathroom; and before eating or preparing food  If soap and water are not readily available, use an alcohol-based hand  with at least 60% alcohol, covering all surfaces of your hands and rubbing them together until they feel dry  Soap and water are the best option if hands are visibly dirty  Avoid touching your eyes, nose, and mouth with unwashed hands  Avoid sharing personal household items    You should not share dishes, drinking glasses, cups, eating utensils, towels, or bedding with other people or pets in your home  After using these items, they should be washed thoroughly with soap and water  Clean all high-touch surfaces everyday    High touch surfaces include counters, tabletops, doorknobs, bathroom fixtures, toilets, phones, keyboards, tablets, and bedside tables  Also, clean any surfaces that may have blood, stool, or body fluids on them  Use a household cleaning spray or wipe, according to the label instructions  Labels contain instructions for safe and effective use of the cleaning product including precautions you should take when applying the product, such as wearing gloves and making sure you have good ventilation during use of the product  Monitor your symptoms    Seek prompt medical attention if your illness is worsening (e g , difficulty breathing)  Before seeking care, call your healthcare provider and tell them that you have, or are being evaluated for, COVID-19  Put on a facemask before you enter the facility  These steps will help the healthcare providers office to keep other people in the office or waiting room from getting infected or exposed  Ask your healthcare provider to call the local or state health department  Persons who are placed under active monitoring or facilitated self-monitoring should follow instructions provided by their local health department or occupational health professionals, as appropriate    If you have a medical emergency and need to call 911, notify the dispatch personnel that you have, or are being evaluated for COVID-19  If possible, put on a facemask before emergency medical services arrive  Discontinuing home isolation    Patients with confirmed COVID-19 should remain under home isolation precautions until the following conditions are met:   - They have had no fever for at least 24 hours (that is one full day of no fever without the use medicine that reduces fevers)  AND  - other symptoms have improved (for example, when their cough or shortness of breath have improved)  AND  - If had mild or moderate illness, at least 10 days have passed since their symptoms first appeared or if severe illness (needed oxygen) or immunosuppressed, at least 20 days have passed since symptoms first appeared  Patients with confirmed COVID-19 should also notify close contacts (including their workplace) and ask that they self-quarantine  Currently, close contact is defined as being within 6 feet for 15 minutes or more from the period 24 hours starting 48 hours before symptom onset to the time at which the patient went into isolation  Close contacts of patients diagnosed with COVID-19 should be instructed by the patient to self-quarantine for 14 days from the last time of their last contact with the patient  Source: [employer]         Follow up with PCP in 3-5 days  Proceed to  ER if symptoms worsen  Chief Complaint     Chief Complaint   Patient presents with    Chills     chills, vomiting, 3x yesterday, muscle aches and fatigue started Thursday night         History of Present Illness       Patient is a 58-year-old female who presents with a 3 day history of congestion, chills, nausea, vomiting, and body aches  Patient states that her boyfriend was tested for COVID-19, 5 days ago and did test positive  Patient states they have not been quarantining at home  Patient denies any fever  Denies any sinus pain or pressure, earache, or sore throat  Patient notes 2 episodes of vomiting yesterday denies any episodes today  Denies any abdominal pain or diarrhea  Denies any urinary symptoms  Denies any chest pain, cough, or palpitations  Denies any loss of taste or smell  Patient notes she was tested for COVID-19 last week because she had a positive exposure at work, was symptomatic at that time  Notes test is negative  Review of Systems   Review of Systems   Constitutional: Positive for chills  Negative for diaphoresis, fatigue and fever  HENT: Positive for congestion and rhinorrhea  Negative for ear pain, facial swelling, postnasal drip, sinus pressure, sinus pain, sore throat, tinnitus and trouble swallowing  Eyes: Negative  Respiratory: Negative for cough, chest tightness, shortness of breath, wheezing and stridor  Cardiovascular: Negative for chest pain and palpitations  Gastrointestinal: Positive for nausea and vomiting  Negative for abdominal pain and diarrhea  Genitourinary: Negative  Musculoskeletal: Positive for myalgias  Negative for arthralgias, back pain, joint swelling, neck pain and neck stiffness  Neurological: Negative for dizziness, syncope, facial asymmetry, weakness, light-headedness, numbness and headaches           Current Medications       Current Outpatient Medications:     acetaminophen (TYLENOL) 500 mg tablet, Take 1 tablet (500 mg total) by mouth every 6 (six) hours as needed for mild pain, Disp: 30 tablet, Rfl: 0    Ascorbic Acid (vitamin C) 100 MG tablet, Take 100 mg by mouth daily, Disp: , Rfl:     B Complex Vitamins (B-COMPLEX/B-12 PO), Take 1 tablet by mouth daily, Disp: , Rfl:     Calcium Carbonate (CALCIUM 600 PO), Take 2 tablets by mouth daily, Disp: , Rfl:     ibuprofen (MOTRIN) 600 mg tablet, Take 1 tablet (600 mg total) by mouth every 6 (six) hours as needed for mild pain, Disp: 30 tablet, Rfl: 0    zinc gluconate 50 mg tablet, Take 50 mg by mouth daily, Disp: , Rfl:     Current Allergies     Allergies as of 04/10/2021    (No Known Allergies)            The following portions of the patient's history were reviewed and updated as appropriate: allergies, current medications, past family history, past medical history, past social history, past surgical history and problem list      Past Medical History:   Diagnosis Date    Sciatica     Scoliosis        Past Surgical History:   Procedure Laterality Date    VA CMBND ANTERPOST COLPORRAPHY W/CYSTO N/A 12/3/2018    Procedure: ANT POST COLPORRHAPHY;  Surgeon: Dalton Juárez MD;  [facility];  Service: UroGynecology           VA CYSTOURETHROSCOPY N/A 12/3/2018    Procedure: Muna Domingo;  Surgeon: Dalton Juárez MD;  [facility];  Service: UroGynecology           VA REVAGINAL PROLAPSE,SACROSP LIG N/A 12/3/2018    Procedure: ANT COLPOPEXY, VEC;  Surgeon: Dalton Juárez MD;  Location[facility];  Service: UroGynecology           VA SLING OPER STRES INCONTINENCE N/A 12/3/2018    Procedure: Sundayda Masood;  Surgeon: Dalton Juárez MD;  [facility];  Service: UroGynecology           TUBAL LIGATION         History reviewed  No pertinent family history  Medications have been verified  Objective   Pulse 95   Temp 98 4 °F (36 9 °C)   Resp 18   Ht 5' 5" (1 651 m)   Wt 73 9 kg (163 lb)   SpO2 98%   BMI 27 12 kg/m²   No LMP recorded  Physical Exam     Physical Exam  Constitutional:       General: She is not in acute distress  Appearance: She is well-developed  She is not diaphoretic  HENT:      Head: Normocephalic and atraumatic  Right Ear: Hearing, tympanic membrane, ear canal and external ear normal       Left Ear: Hearing, tympanic membrane, ear canal and external ear normal       Nose: Rhinorrhea present  No mucosal edema  Right Sinus: No maxillary sinus tenderness or frontal sinus tenderness        Left Sinus: No maxillary sinus tenderness or frontal sinus tenderness  Mouth/Throat:      Pharynx: Oropharynx is clear  Uvula midline  Cardiovascular:      Rate and Rhythm: Normal rate and regular rhythm  Heart sounds: Normal heart sounds, S1 normal and S2 normal    Pulmonary:      Effort: Pulmonary effort is normal       Breath sounds: Normal breath sounds and air entry  Lymphadenopathy:      Cervical: No cervical adenopathy  Skin:     General: Skin is warm and dry  Capillary Refill: Capillary refill takes less than 2 seconds  Neurological:      Mental Status: She is alert and oriented to person, place, and time

## 2021-04-10 NOTE — PATIENT INSTRUCTIONS
Patient instructed to self quarantine  Advised to avoid nsaids  If you develop prolonged high fever, worsening or productive cough, shortness of breath, difficulty breathing, chest pain, or any new or concerning symptoms please proceed ER  Instructed patient to call ER prior to arrival make them aware she is being test for covid  Patient verbalizes understanding  Start vitamin c 1g twice daily,vitamin d3 2000IU daily, and multivitamin  monitor pulse ox  Call PCP in 24 hours for f/u  [address]    Your healthcare provider and/or public health staff have evaluated you and have determined that you do not need to remain in the hospital at this time  At this time you can be isolated at home where you will be monitored by staff from your local or state health department  You should carefully follow the prevention and isolation steps below until a healthcare provider or local or state health department says that you can return to your normal activities  Stay home except to get medical care    People who are mildly ill with COVID-19 are able to isolate at home during their illness  You should restrict activities outside your home, except for getting medical care  Do not go to work, school, or public areas  Avoid using public transportation, ride-sharing, or taxis  Separate yourself from other people and animals in your home    People: As much as possible, you should stay in a specific room and away from other people in your home  Also, you should use a separate bathroom, if available  Animals: You should restrict contact with pets and other animals while you are sick with COVID-19, just like you would around other people  Although there have not been reports of pets or other animals becoming sick with COVID-19, it is still recommended that people sick with COVID-19 limit contact with animals until more information is known about the virus   When possible, have another member of your household care for your animals while you are sick  If you are sick with COVID-19, avoid contact with your pet, including petting, snuggling, being kissed or licked, and sharing food  If you must care for your pet or be around animals while you are sick, wash your hands before and after you interact with pets and wear a facemask  See COVID-19 and Animals for more information  Call ahead before visiting your doctor    If you have a medical appointment, call the healthcare provider and tell them that you have or may have COVID-19  This will help the healthcare providers office take steps to keep other people from getting infected or exposed  Wear a facemask    You should wear a facemask when you are around other people (e g , sharing a room or vehicle) or pets and before you enter a healthcare providers office  If you are not able to wear a facemask (for example, because it causes trouble breathing), then people who live with you should not stay in the same room with you, or they should wear a facemask if they enter your room  Cover your coughs and sneezes    Cover your mouth and nose with a tissue when you cough or sneeze  Throw used tissues in a lined trash can  Immediately wash your hands with soap and water for at least 20 seconds or, if soap and water are not available, clean your hands with an alcohol-based hand  that contains at least 60% alcohol  Clean your hands often    Wash your hands often with soap and water for at least 20 seconds, especially after blowing your nose, coughing, or sneezing; going to the bathroom; and before eating or preparing food  If soap and water are not readily available, use an alcohol-based hand  with at least 60% alcohol, covering all surfaces of your hands and rubbing them together until they feel dry  Soap and water are the best option if hands are visibly dirty  Avoid touching your eyes, nose, and mouth with unwashed hands      Avoid sharing personal household items    You should not share dishes, drinking glasses, cups, eating utensils, towels, or bedding with other people or pets in your home  After using these items, they should be washed thoroughly with soap and water  Clean all high-touch surfaces everyday    High touch surfaces include counters, tabletops, doorknobs, bathroom fixtures, toilets, phones, keyboards, tablets, and bedside tables  Also, clean any surfaces that may have blood, stool, or body fluids on them  Use a household cleaning spray or wipe, according to the label instructions  Labels contain instructions for safe and effective use of the cleaning product including precautions you should take when applying the product, such as wearing gloves and making sure you have good ventilation during use of the product  Monitor your symptoms    Seek prompt medical attention if your illness is worsening (e g , difficulty breathing)  Before seeking care, call your healthcare provider and tell them that you have, or are being evaluated for, COVID-19  Put on a facemask before you enter the facility  These steps will help the healthcare providers office to keep other people in the office or waiting room from getting infected or exposed  Ask your healthcare provider to call the local or [facility] health department  Persons who are placed under active monitoring or facilitated self-monitoring should follow instructions provided by their local health department or occupational health professionals, as appropriate  If you have a medical emergency and need to call 911, notify the dispatch personnel that you have, or are being evaluated for COVID-19  If possible, put on a facemask before emergency medical services arrive      Discontinuing home isolation    Patients with confirmed COVID-19 should remain under home isolation precautions until the following conditions are met:   - They have had no fever for at least 24 hours (that is one full day of no fever without the use medicine that reduces fevers)  AND  - other symptoms have improved (for example, when their cough or shortness of breath have improved)  AND  - If had mild or moderate illness, at least 10 days have passed since their symptoms first appeared or if severe illness (needed oxygen) or immunosuppressed, at least 20 days have passed since symptoms first appeared  Patients with confirmed COVID-19 should also notify close contacts (including their workplace) and ask that they self-quarantine  Currently, close contact is defined as being within 6 feet for 15 minutes or more from the period 24 hours starting 48 hours before symptom onset to the time at which the patient went into isolation  Close contacts of patients diagnosed with COVID-19 should be instructed by the patient to self-quarantine for 14 days from the last time of their last contact with the patient       Source: [employer]

## 2021-04-11 ENCOUNTER — TELEPHONE (OUTPATIENT)
Dept: URGENT CARE | Facility: CLINIC | Age: 61
End: 2021-04-11

## 2021-04-11 NOTE — TELEPHONE ENCOUNTER
Patient notified of positive COVID-19 test results  Advised to start vitamin-C, D, and multivitamin  Advised to monitor pulse ox, if you drop below 94% advised to return or  to proceed ER  Or you develop any new or worsening symptoms  Advised follow-up with PCP in 24 hours  Instructed to self isolate  Will need to be out of work for 10 days from symptom onset  Patient verbalizes understanding

## 2021-04-15 DIAGNOSIS — U07.1 COVID-19: ICD-10-CM

## 2021-04-15 PROCEDURE — U0005 INFEC AGEN DETEC AMPLI PROBE: HCPCS | Performed by: FAMILY MEDICINE

## 2021-04-15 PROCEDURE — U0003 INFECTIOUS AGENT DETECTION BY NUCLEIC ACID (DNA OR RNA); SEVERE ACUTE RESPIRATORY SYNDROME CORONAVIRUS 2 (SARS-COV-2) (CORONAVIRUS DISEASE [COVID-19]), AMPLIFIED PROBE TECHNIQUE, MAKING USE OF HIGH THROUGHPUT TECHNOLOGIES AS DESCRIBED BY CMS-2020-01-R: HCPCS | Performed by: FAMILY MEDICINE

## 2021-04-16 ENCOUNTER — HOSPITAL ENCOUNTER (EMERGENCY)
Facility: HOSPITAL | Age: 61
Discharge: HOME | End: 2021-04-16
Attending: EMERGENCY MEDICINE
Payer: COMMERCIAL

## 2021-04-16 ENCOUNTER — APPOINTMENT (EMERGENCY)
Dept: RADIOLOGY | Facility: HOSPITAL | Age: 61
End: 2021-04-16
Payer: COMMERCIAL

## 2021-04-16 VITALS
HEART RATE: 93 BPM | HEIGHT: 66 IN | WEIGHT: 163 LBS | TEMPERATURE: 101.2 F | RESPIRATION RATE: 28 BRPM | DIASTOLIC BLOOD PRESSURE: 76 MMHG | BODY MASS INDEX: 26.2 KG/M2 | SYSTOLIC BLOOD PRESSURE: 126 MMHG | OXYGEN SATURATION: 99 %

## 2021-04-16 DIAGNOSIS — U07.1 COVID-19: Primary | ICD-10-CM

## 2021-04-16 LAB
ALBUMIN SERPL BCP-MCNC: 4.1 G/DL (ref 3.5–5.7)
ALP SERPL-CCNC: 112 U/L (ref 55–165)
ALT SERPL W P-5'-P-CCNC: 55 U/L (ref 7–52)
ANION GAP SERPL CALCULATED.3IONS-SCNC: 9 MMOL/L (ref 4–13)
AST SERPL W P-5'-P-CCNC: 52 U/L (ref 13–39)
BASOPHILS # BLD AUTO: 0 THOUSANDS/ΜL (ref 0–0.1)
BASOPHILS NFR BLD AUTO: 1 % (ref 0–2)
BILIRUB SERPL-MCNC: 0.5 MG/DL (ref 0.2–1)
BUN SERPL-MCNC: 9 MG/DL (ref 7–25)
CALCIUM SERPL-MCNC: 8.5 MG/DL (ref 8.6–10.5)
CHLORIDE SERPL-SCNC: 96 MMOL/L (ref 98–107)
CO2 SERPL-SCNC: 27 MMOL/L (ref 21–31)
CREAT SERPL-MCNC: 0.67 MG/DL (ref 0.6–1.2)
D DIMER PPP FEU-MCNC: 0.39 UG/ML FEU
EOSINOPHIL # BLD AUTO: 0 THOUSAND/ΜL (ref 0–0.61)
EOSINOPHIL NFR BLD AUTO: 0 % (ref 0–5)
ERYTHROCYTE [DISTWIDTH] IN BLOOD BY AUTOMATED COUNT: 13.3 % (ref 11.5–14.5)
GFR SERPL CREATININE-BSD FRML MDRD: 94 ML/MIN/1.73SQ M
GLUCOSE SERPL-MCNC: 88 MG/DL (ref 65–99)
HCT VFR BLD AUTO: 37.5 % (ref 42–47)
HGB BLD-MCNC: 13 G/DL (ref 12–16)
HOLD SPECIMEN: NORMAL
LACTATE SERPL-SCNC: 0.9 MMOL/L (ref 0.5–2)
LYMPHOCYTES # BLD AUTO: 0.6 THOUSANDS/ΜL (ref 0.6–4.47)
LYMPHOCYTES NFR BLD AUTO: 12 % (ref 21–51)
MCH RBC QN AUTO: 30 PG (ref 26–34)
MCHC RBC AUTO-ENTMCNC: 34.7 G/DL (ref 31–37)
MCV RBC AUTO: 86 FL (ref 81–99)
MONOCYTES # BLD AUTO: 0.4 THOUSAND/ΜL (ref 0.17–1.22)
MONOCYTES NFR BLD AUTO: 8 % (ref 2–12)
NEUTROPHILS # BLD AUTO: 3.6 THOUSANDS/ΜL (ref 1.4–6.5)
NEUTS SEG NFR BLD AUTO: 79 % (ref 42–75)
PLATELET # BLD AUTO: 175 THOUSANDS/UL (ref 149–390)
PMV BLD AUTO: 7.8 FL (ref 8.6–11.7)
POTASSIUM SERPL-SCNC: 4 MMOL/L (ref 3.5–5.5)
PROT SERPL-MCNC: 7.5 G/DL (ref 6.4–8.9)
RBC # BLD AUTO: 4.34 MILLION/UL (ref 3.9–5.2)
SARS-COV-2 RNA RESP QL NAA+PROBE: POSITIVE
SODIUM SERPL-SCNC: 132 MMOL/L (ref 134–143)
WBC # BLD AUTO: 4.5 THOUSAND/UL (ref 4.8–10.8)

## 2021-04-16 PROCEDURE — 93005 ELECTROCARDIOGRAM TRACING: CPT

## 2021-04-16 PROCEDURE — 36415 COLL VENOUS BLD VENIPUNCTURE: CPT | Performed by: PHYSICIAN ASSISTANT

## 2021-04-16 PROCEDURE — 99284 EMERGENCY DEPT VISIT MOD MDM: CPT

## 2021-04-16 PROCEDURE — 80053 COMPREHEN METABOLIC PANEL: CPT | Performed by: PHYSICIAN ASSISTANT

## 2021-04-16 PROCEDURE — 96360 HYDRATION IV INFUSION INIT: CPT

## 2021-04-16 PROCEDURE — 85025 COMPLETE CBC W/AUTO DIFF WBC: CPT | Performed by: PHYSICIAN ASSISTANT

## 2021-04-16 PROCEDURE — 87040 BLOOD CULTURE FOR BACTERIA: CPT | Performed by: PHYSICIAN ASSISTANT

## 2021-04-16 PROCEDURE — 85379 FIBRIN DEGRADATION QUANT: CPT | Performed by: PHYSICIAN ASSISTANT

## 2021-04-16 PROCEDURE — 83605 ASSAY OF LACTIC ACID: CPT | Performed by: PHYSICIAN ASSISTANT

## 2021-04-16 PROCEDURE — 99284 EMERGENCY DEPT VISIT MOD MDM: CPT | Performed by: PHYSICIAN ASSISTANT

## 2021-04-16 PROCEDURE — 71045 X-RAY EXAM CHEST 1 VIEW: CPT

## 2021-04-16 RX ORDER — ACETAMINOPHEN 325 MG/1
975 TABLET ORAL ONCE
Status: COMPLETED | OUTPATIENT
Start: 2021-04-16 | End: 2021-04-16

## 2021-04-16 RX ORDER — DOXYCYCLINE HYCLATE 100 MG/1
100 CAPSULE ORAL 2 TIMES DAILY
Qty: 14 CAPSULE | Refills: 0 | Status: SHIPPED | OUTPATIENT
Start: 2021-04-16 | End: 2021-04-23

## 2021-04-16 RX ADMIN — ACETAMINOPHEN 975 MG: 325 TABLET ORAL at 16:18

## 2021-04-16 RX ADMIN — SODIUM CHLORIDE 1000 ML: 0.9 INJECTION, SOLUTION INTRAVENOUS at 15:28

## 2021-04-16 NOTE — DISCHARGE INSTRUCTIONS
You may use 3 regular strength Tylenol as well as 3 Advil alternating every 3 hours to manage or fever and body aches  Please utilize a tbsp of honey to manage a cough as needed  Take antibiotic as prescribed  Follow-up with your primary care  You have been diagnosed with confirmed or suspected COVID-19  It is recommended that you take the following medications at home:    - Vitamin D3 2000 IU (units) once daily    - Vitamin C 1000mg (1g) every 12 hrs     - Multivitamin once daily    Please get a finger pulse oximeter for home use       Return to the Emergency Department if your oxygen levels (SpO2%) drops below 92%

## 2021-04-16 NOTE — ED PROVIDER NOTES
History  Chief Complaint   Patient presents with    Cough     states wanted to get checked out , states has a bad cough and has covid, feeling feverish      49-year-old female presents emergency department with concern for worsening COVID symptoms  She was diagnosed with COVID approximately 6 days ago  She reports having fevers and chills at home for which she has been taking DayQuil and NyQuil  She reports worsening cough with some associated fatigue  She does have some shortness of breath as well  Rest she is well-appearing in no acute distress  There is a persistent cough noted  Allergies reviewed          Prior to Admission Medications   Prescriptions Last Dose Informant Patient Reported? Taking?    Ascorbic Acid (vitamin C) 100 MG tablet   Yes No   Sig: Take 100 mg by mouth daily   B Complex Vitamins (B-COMPLEX/B-12 PO)   Yes No   Sig: Take 1 tablet by mouth daily   Calcium Carbonate (CALCIUM 600 PO)   Yes No   Sig: Take 2 tablets by mouth daily   acetaminophen (TYLENOL) 500 mg tablet   No No   Sig: Take 1 tablet (500 mg total) by mouth every 6 (six) hours as needed for mild pain   ibuprofen (MOTRIN) 600 mg tablet   No No   Sig: Take 1 tablet (600 mg total) by mouth every 6 (six) hours as needed for mild pain   zinc gluconate 50 mg tablet   Yes No   Sig: Take 50 mg by mouth daily      Facility-Administered Medications: None       Past Medical History:   Diagnosis Date    Sciatica     Scoliosis        Past Surgical History:   Procedure Laterality Date    [place] CMBND ANTERPOST COLPORRAPHY W/CYSTO N/A 12/3/2018    Procedure: ANT POST COLPORRHAPHY;  Surgeon: Meagan Escoto MD;  [facility];  Service: UroGynecology           [place] CYSTOURETHROSCOPY N/A 12/3/2018    Procedure: López Nunez;  Surgeon: Meagan Escoto MD;  [facility];  Service: UroGynecology           [place] REVAGINAL PROLAPSE,SACROSP LIG N/A 12/3/2018    Procedure: ANT COLPOPEXY, VEC;  Surgeon: Meagan Escoto MD;  [facility];  Service: UroGynecology           [place] SLING OPER STRES INCONTINENCE N/A 12/3/2018    Procedure: [facility];  Surgeon: Heidi Callejas MD;  Location: [facility];  Service: UroGynecology           TUBAL LIGATION         History reviewed  No pertinent family history  I have reviewed and agree with the history as documented  E-Cigarette/Vaping     E-Cigarette/Vaping Substances     Social History     Tobacco Use    Smoking status: Never Smoker    Smokeless tobacco: Never Used   Substance Use Topics    Alcohol use: Yes     Frequency: Monthly or less     Comment: occasionally     Drug use: No       Review of Systems   Constitutional: Positive for chills, fatigue and fever  HENT: Negative for congestion, ear pain, rhinorrhea, sinus pressure, sneezing, sore throat and trouble swallowing  Eyes: Negative for discharge and itching  Respiratory: Positive for cough and shortness of breath  Negative for chest tightness, wheezing and stridor  Cardiovascular: Negative for chest pain and palpitations  Gastrointestinal: Negative for abdominal pain, diarrhea, nausea and vomiting  Neurological: Negative for dizziness, syncope, numbness and headaches  All other systems reviewed and are negative  Physical Exam  Physical Exam  Vitals signs and nursing note reviewed  Constitutional:       General: She is not in acute distress  Appearance: She is well-developed  She is not diaphoretic  HENT:      Head: Normocephalic and atraumatic  Right Ear: External ear normal       Left Ear: External ear normal       Nose: Nose normal    Eyes:      Conjunctiva/sclera: Conjunctivae normal       Pupils: Pupils are equal, round, and reactive to light  Neck:      Musculoskeletal: Normal range of motion  Cardiovascular:      Rate and Rhythm: Normal rate and regular rhythm  Heart sounds: Normal heart sounds  No murmur  No friction rub  No gallop      Pulmonary:      Effort: Pulmonary effort is normal  No respiratory distress  Breath sounds: Normal breath sounds  No stridor  No wheezing or rales  Abdominal:      General: Bowel sounds are normal  There is no distension  Palpations: Abdomen is soft  Tenderness: There is no abdominal tenderness  There is no guarding  Musculoskeletal: Normal range of motion  General: No tenderness  Skin:     General: Skin is warm  Capillary Refill: Capillary refill takes less than 2 seconds  Neurological:      Mental Status: She is alert and oriented to person, place, and time           Vital Signs  ED Triage Vitals   Temperature Pulse Respirations Blood Pressure SpO2   04/16/21 1433 04/16/21 1433 04/16/21 1433 04/16/21 1433 04/16/21 1433   (!) 101 °F (38 3 °C) 99 20 123/70 97 %      Temp Source Heart Rate Source Patient Position - Orthostatic VS BP Location FiO2 (%)   04/16/21 1530 04/16/21 1433 -- 04/16/21 1433 --   Tympanic Monitor  Left arm       Pain Score       --                  Vitals:    04/16/21 1433 04/16/21 1530 04/16/21 1600   BP: 123/70 130/65 126/76   Pulse: 99 93 93         Visual Acuity      ED Medications  Medications   sodium chloride 0 9 % bolus 1,000 mL (0 mL Intravenous Stopped 4/16/21 1648)   acetaminophen (TYLENOL) tablet 975 mg (975 mg Oral Given 4/16/21 1618)       Diagnostic Studies  Results Reviewed     Procedure Component Value Units Date/Time    Comprehensive metabolic panel [[id number]]  (Abnormal) Collected: 04/16/21 1528    Lab Status: Final result Specimen: Blood from Arm, Right Updated: 04/16/21 1553     Sodium 132 mmol/L      Potassium 4 0 mmol/L      Chloride 96 mmol/L      CO2 27 mmol/L      ANION GAP 9 mmol/L      BUN 9 mg/dL      Creatinine 0 67 mg/dL      Glucose 88 mg/dL      Calcium 8 5 mg/dL      AST 52 U/L      ALT 55 U/L      Alkaline Phosphatase 112 U/L      Total Protein 7 5 g/dL      Albumin 4 1 g/dL      Total Bilirubin 0 50 mg/dL      eGFR 94 ml/min/1 73sq m     Narrative:      Consolidated [employer] guidelines for Chronic Kidney Disease (CKD):     Stage 1 with normal or high GFR (GFR > 90 mL/min/1 73 square meters)    Stage 2 Mild CKD (GFR = 60-89 mL/min/1 73 square meters)    Stage 3A Moderate CKD (GFR = 45-59 mL/min/1 73 square meters)    Stage 3B Moderate CKD (GFR = 30-44 mL/min/1 73 square meters)    Stage 4 Severe CKD (GFR = 15-29 mL/min/1 73 square meters)    Stage 5 End Stage CKD (GFR <15 mL/min/1 73 square meters)  Note: GFR calculation is accurate only with a steady state creatinine    Lactic acid [[id number]]  (Normal) Collected: 04/16/21 1528    Lab Status: Final result Specimen: Blood from Arm, Right Updated: 04/16/21 1550     LACTIC ACID 0 9 mmol/L     Narrative:      Result may be elevated if tourniquet was used during collection  [place] draw [[id number]] Collected: 04/16/21 1528    Lab Status: Final result Specimen: Blood from Arm, Right Updated: 04/16/21 1550    Narrative: The following orders were created for panel order [place] draw  Procedure                               Abnormality         Status                     ---------                               -----------         ------                     Rivera Rude Top on NTDE[[id number]]                           Final result               Gold top on JPXU[[id number]]                                 Final result               Green / Yellow tube on NSPD[[id number]]                      Final result               Green / Black tube on ZLKC[[id number]]                       Final result               Lavender Top 3 ml on MMUL[[id number]]                        Final result               Lavender Top 7ml on DKVM[[id number]]                         Final result                 Please view results for these tests on the individual orders      D-Dimer [[id number]]  (Normal) Collected: 04/16/21 1528    Lab Status: Final result Specimen: Blood from Arm, Right Updated: 04/16/21 1549     D-Dimer, Quant 0 39 ug/ml FEU Blood culture #1 [[id number]] Collected: 04/16/21 1520    Lab Status: In process Specimen: Blood from Arm, Right Updated: 04/16/21 1542    Blood culture #2 [[id number]] Collected: 04/16/21 1528    Lab Status: In process Specimen: Blood from Arm, Right Updated: 04/16/21 1542    CBC and differential [[id number]]  (Abnormal) Collected: 04/16/21 1528    Lab Status: Final result Specimen: Blood from Arm, Right Updated: 04/16/21 1540     WBC 4 50 Thousand/uL      RBC 4 34 Million/uL      Hemoglobin 13 0 g/dL      Hematocrit 37 5 %      MCV 86 fL      MCH 30 0 pg      MCHC 34 7 g/dL      RDW 13 3 %      MPV 7 8 fL      Platelets 045 Thousands/uL      Neutrophils Relative 79 %      Lymphocytes Relative 12 %      Monocytes Relative 8 %      Eosinophils Relative 0 %      Basophils Relative 1 %      Neutrophils Absolute 3 60 Thousands/µL      Lymphocytes Absolute 0 60 Thousands/µL      Monocytes Absolute 0 40 Thousand/µL      Eosinophils Absolute 0 00 Thousand/µL      Basophils Absolute 0 00 Thousands/µL     UA (URINE) with reflex to Scope [[id number]]     Lab Status: No result Specimen: Urine                  XR chest 1 view portable   Final Result by Ani Ruff MD (04/16 1617)      No acute cardiopulmonary disease  Workstation performed: [id number]                    Procedures  Procedures         ED Course                                           MDM  Number of Diagnoses or Management Options  COVID-19:   Diagnosis management comments: Benign physical examination  Chest x-ray appears unremarkable  EKG unremarkable as well  D-dimer negative  Recommend supportive care  Patient educated regarding [phone]  Recommend close follow-up with PCP  Patient educated regarding their diagnosis and given return and follow-up instructions  Patient was advised to returned to the ED with worsening symptoms or concerns  Patient is understanding of and in agreement with the treatment plan    There are no questions at the time of discharge  Amount and/or Complexity of Data Reviewed  Clinical lab tests: ordered and reviewed  Tests in the radiology section of CPT®: ordered and reviewed    Risk of Complications, Morbidity, and/or Mortality  Presenting problems: moderate  Diagnostic procedures: low  Management options: low    Patient Progress  Patient progress: stable      Disposition  Final diagnoses:   COVID-19     Time reflects when diagnosis was documented in both MDM as applicable and the Disposition within this note     Time User Action Codes Description Comment    4/16/2021  4:45 PM Colleen Robbins Add [U07 1] COVID-19       ED Disposition     ED Disposition Condition Date/Time Comment    Discharge Stable Fri Apr 16, 2021  4:45 PM Neisha Jasso discharge to home/self care              Follow-up Information     Follow up With Specialties Details [address] [address] [address] [facility]  [phone]            Discharge Medication List as of 4/16/2021  4:47 PM      START taking these medications    Details   doxycycline hyclate (VIBRAMYCIN) 100 mg capsule Take 1 capsule (100 mg total) by mouth 2 (two) times a day for 7 days, Starting Fri 4/16/2021, Until Fri 4/23/2021, Normal         CONTINUE these medications which have NOT CHANGED    Details   acetaminophen (TYLENOL) 500 mg tablet Take 1 tablet (500 mg total) by mouth every 6 (six) hours as needed for mild pain, Starting Tue 12/4/2018, No Print      Ascorbic Acid (vitamin C) 100 MG tablet Take 100 mg by mouth daily, Historical Med      B Complex Vitamins (B-COMPLEX/B-12 PO) Take 1 tablet by mouth daily, Historical Med      Calcium Carbonate (CALCIUM 600 PO) Take 2 tablets by mouth daily, Historical Med      ibuprofen (MOTRIN) 600 mg tablet Take 1 tablet (600 mg total) by mouth every 6 (six) hours as needed for mild pain, Starting Tue 12/4/2018, No Print      zinc gluconate 50 mg tablet Take 50 mg by mouth daily, Historical Med           No discharge procedures on file      PDMP Review     None          ED Provider  Electronically Signed by           Merlyn Gutierrez PA-C  04/16/21 6008

## 2021-04-20 LAB
ATRIAL RATE: 96 BPM
P AXIS: 71 DEGREES
PR INTERVAL: 148 MS
QRS AXIS: 43 DEGREES
QRSD INTERVAL: 74 MS
QT INTERVAL: 334 MS
QTC INTERVAL: 421 MS
T WAVE AXIS: 66 DEGREES
VENTRICULAR RATE: 96 BPM

## 2021-04-20 PROCEDURE — 93010 ELECTROCARDIOGRAM REPORT: CPT | Performed by: INTERNAL MEDICINE

## 2021-04-21 LAB
BACTERIA BLD CULT: NORMAL
BACTERIA BLD CULT: NORMAL

## 2022-11-19 ENCOUNTER — OFFICE VISIT (OUTPATIENT)
Dept: URGENT CARE | Facility: CLINIC | Age: 62
End: 2022-11-19

## 2022-11-19 VITALS — OXYGEN SATURATION: 100 % | HEART RATE: 90 BPM | RESPIRATION RATE: 18 BRPM | TEMPERATURE: 98.1 F

## 2022-11-19 DIAGNOSIS — J01.90 ACUTE SINUSITIS, RECURRENCE NOT SPECIFIED, UNSPECIFIED LOCATION: Primary | ICD-10-CM

## 2022-11-19 NOTE — PATIENT INSTRUCTIONS
Mucinex DM once every 12 hours  Astelin nasal spray as prescribed by ENT  Chloraseptic throat spray, over the counter  Ibuprofen or tylenol for pain or fever  Follow up with PCP in 3-5 days if no improvement  Proceed to ER if symptoms worsen

## 2022-11-19 NOTE — PROGRESS NOTES
[facility]        NAME: Garima Koenig is a 57 y o  female  : 1960    MRN: [id number]  DATE: 2022  TIME: 5:02 PM    Assessment and Plan   Acute sinusitis, recurrence not specified, unspecified location [J01 90]  1  Acute sinusitis, recurrence not specified, unspecified location  dextromethorphan-guaifenesin (MUCINEX DM)  MG per 12 hr tablet        Rapid Covid negative    Patient Instructions   Mucinex DM once every 12 hours  Astelin nasal spray as prescribed by ENT  Chloraseptic throat spray, over the counter  Follow up with PCP in 3-5 days if no improvement  Proceed to ER if symptoms worsen  Chief Complaint     Chief Complaint   Patient presents with   • Cough     Started on Thursday         History of Present Illness       This is a 57year old female presenting with complaint of cough x 2 days  Reports history of chronic congestion, through it has worsened lately  Reports a lot of mucus in her throat  Says she has trouble swallowing sometimes chronically  Mild ear pressure  Admits post nasal drip  Denies n/v/d/c, fever, chills, CP, or SOB  Uses azelastine as prescribed by her ENT  Recent sick contacts at work and granddaughter  States at work there is a lot of dust and this has been causing a lot of coughing for her  Pt using nasal irrigation too  Review of Systems   Review of Systems   Constitutional: Negative for chills, fatigue and fever  HENT: Positive for congestion, postnasal drip, rhinorrhea, sinus pressure and sore throat  Negative for voice change  Eyes: Negative  Respiratory: Negative  Cardiovascular: Negative  Gastrointestinal: Negative  Endocrine: Negative  Genitourinary: Negative  Musculoskeletal: Negative  Skin: Negative  Allergic/Immunologic: Positive for environmental allergies  Neurological: Negative  Hematological: Negative  Psychiatric/Behavioral: Negative            Current Medications       Current Outpatient Medications:   •  acetaminophen (TYLENOL) 500 mg tablet, Take 1 tablet (500 mg total) by mouth every 6 (six) hours as needed for mild pain, Disp: 30 tablet, Rfl: 0  •  Ascorbic Acid (vitamin C) 100 MG tablet, Take 100 mg by mouth daily, Disp: , Rfl:   •  azelastine (ASTELIN) 0 1 % nasal spray, 1 spray into each nostril 2 (two) times a day, Disp: 30 mL, Rfl: 11  •  Calcium Carbonate (CALCIUM 600 PO), Take 2 tablets by mouth daily, Disp: , Rfl:   •  dextromethorphan-guaifenesin (MUCINEX DM)  MG per 12 hr tablet, Take 1 tablet by mouth every 12 (twelve) hours, Disp: 14 tablet, Rfl: 0  •  ibuprofen (MOTRIN) 600 mg tablet, Take 1 tablet (600 mg total) by mouth every 6 (six) hours as needed for mild pain, Disp: 30 tablet, Rfl: 0  •  zinc gluconate 50 mg tablet, Take 50 mg by mouth daily, Disp: , Rfl:   •  B Complex Vitamins (B-COMPLEX/B-12 PO), Take 1 tablet by mouth daily (Patient not taking: Reported on 11/19/2022), Disp: , Rfl:     Current Allergies     Allergies as of 11/19/2022   • (No Known Allergies)            The following portions of the patient's history were reviewed and updated as appropriate: allergies, current medications, past family history, past medical history, past social history, past surgical history and problem list      Past Medical History:   Diagnosis Date   • Sciatica    • Scoliosis        Past Surgical History:   Procedure Laterality Date   • [place] CMBND ANTERPOST COLPORRAPHY W/CYSTO N/A 12/3/2018    Procedure: ANT POST COLPORRHAPHY;  Surgeon: Heidi Callejas MD;  [facility];  Service: UroGynecology          • [place] CYSTOURETHROSCOPY N/A 12/3/2018    Procedure: Jaci Boland;  Surgeon: Heidi Callejas MD;  [facility];  Service: UroGynecology          • [place] REVAGINAL PROLAPSE,SACROSP LIG N/A 12/3/2018    Procedure: ANT COLPOPEXY, VEC;  Surgeon: Heidi Callejas MD;  [facility];  Service: UroGynecology          • [place] SLING OPER STRES INCONTINENCE N/A 12/3/2018    Procedure: RETROPUBIC SLING;  Surgeon: Elona Osler, MD;  Location: [facility];  Service: UroGynecology          • TUBAL LIGATION         No family history on file  Medications have been verified  Objective   Pulse 90   Temp 98 1 °F (36 7 °C)   Resp 18   SpO2 100%   No LMP recorded  Patient is postmenopausal        Physical Exam     Physical Exam  Constitutional:       General: She is not in acute distress  Appearance: Normal appearance  She is normal weight  She is ill-appearing  HENT:      Head: Normocephalic and atraumatic  Right Ear: Tympanic membrane and ear canal normal       Left Ear: Tympanic membrane and ear canal normal       Nose: Septal deviation present  Right Turbinates: Enlarged  Not pale  Left Turbinates: Enlarged  Not pale  Right Sinus: No maxillary sinus tenderness  Left Sinus: No maxillary sinus tenderness or frontal sinus tenderness  Mouth/Throat:      Pharynx: Oropharynx is clear  No posterior oropharyngeal erythema or uvula swelling  Cardiovascular:      Rate and Rhythm: Normal rate and regular rhythm  Heart sounds: Normal heart sounds  No murmur heard  No gallop  Pulmonary:      Effort: Pulmonary effort is normal  No respiratory distress  Breath sounds: Normal breath sounds  No stridor  No wheezing, rhonchi or rales  Abdominal:      General: Abdomen is flat  Bowel sounds are normal       Palpations: Abdomen is soft  Musculoskeletal:         General: Normal range of motion  Cervical back: Normal range of motion and neck supple  Right lower leg: No edema  Left lower leg: No edema  Skin:     General: Skin is warm  Capillary Refill: Capillary refill takes less than 2 seconds  Neurological:      General: No focal deficit present  Mental Status: She is alert and oriented to person, place, and time     Psychiatric:         Mood and Affect: Mood normal          Behavior: Behavior normal          Thought Content:  Thought content normal          Judgment: Judgment normal        Adalberto Paris PA-C

## 2023-04-18 PROBLEM — R53.83 OTHER FATIGUE: Status: ACTIVE | Noted: 2023-04-18

## 2023-04-18 PROBLEM — Z00.00 WELL ADULT EXAM: Status: ACTIVE | Noted: 2023-04-18

## 2023-06-15 ENCOUNTER — HOSPITAL ENCOUNTER (OUTPATIENT)
Dept: BONE DENSITY | Facility: HOSPITAL | Age: 63
End: 2023-06-15
Payer: MEDICARE

## 2023-06-15 ENCOUNTER — HOSPITAL ENCOUNTER (OUTPATIENT)
Dept: MAMMOGRAPHY | Facility: HOSPITAL | Age: 63
End: 2023-06-15
Payer: MEDICARE

## 2023-06-15 VITALS — BODY MASS INDEX: 25.86 KG/M2 | HEIGHT: 66 IN | WEIGHT: 160.94 LBS

## 2023-06-15 VITALS — HEIGHT: 66 IN | WEIGHT: 164 LBS | BODY MASS INDEX: 26.36 KG/M2

## 2023-06-15 DIAGNOSIS — E28.39 PRIMARY OVARIAN FAILURE: ICD-10-CM

## 2023-06-15 DIAGNOSIS — Z12.31 ENCOUNTER FOR SCREENING MAMMOGRAM FOR MALIGNANT NEOPLASM OF BREAST: ICD-10-CM

## 2023-06-15 PROCEDURE — 77063 BREAST TOMOSYNTHESIS BI: CPT

## 2023-06-15 PROCEDURE — 77067 SCR MAMMO BI INCL CAD: CPT

## 2023-06-15 PROCEDURE — 77080 DXA BONE DENSITY AXIAL: CPT

## 2023-06-17 PROBLEM — Z00.00 WELL ADULT EXAM: Status: RESOLVED | Noted: 2023-04-18 | Resolved: 2023-06-17

## 2023-06-29 ENCOUNTER — TELEPHONE (OUTPATIENT)
Dept: INTERNAL MEDICINE CLINIC | Facility: CLINIC | Age: 63
End: 2023-06-29

## 2023-07-13 ENCOUNTER — TELEPHONE (OUTPATIENT)
Dept: INTERNAL MEDICINE CLINIC | Facility: CLINIC | Age: 63
End: 2023-07-13

## 2023-07-14 DIAGNOSIS — M81.0 AGE-RELATED OSTEOPOROSIS WITHOUT CURRENT PATHOLOGICAL FRACTURE: Primary | ICD-10-CM

## 2023-09-01 ENCOUNTER — TELEPHONE (OUTPATIENT)
Dept: INTERNAL MEDICINE CLINIC | Facility: CLINIC | Age: 63
End: 2023-09-01

## 2023-09-01 DIAGNOSIS — L25.5 RHUS DERMATITIS: Primary | ICD-10-CM

## 2023-09-01 RX ORDER — METHYLPREDNISOLONE 4 MG/1
TABLET ORAL
Qty: 21 EACH | Refills: 0 | Status: SHIPPED | OUTPATIENT
Start: 2023-09-01

## 2023-09-01 NOTE — TELEPHONE ENCOUNTER
Patient left message states has poison and would like a script sent in to her pharmacy or what is best to use on the poison? Thank you.

## 2023-10-17 ENCOUNTER — OFFICE VISIT (OUTPATIENT)
Dept: INTERNAL MEDICINE CLINIC | Facility: CLINIC | Age: 63
End: 2023-10-17
Payer: MEDICARE

## 2023-10-17 VITALS
TEMPERATURE: 97.5 F | DIASTOLIC BLOOD PRESSURE: 80 MMHG | HEART RATE: 84 BPM | HEIGHT: 66 IN | RESPIRATION RATE: 18 BRPM | BODY MASS INDEX: 26.48 KG/M2 | SYSTOLIC BLOOD PRESSURE: 132 MMHG | OXYGEN SATURATION: 99 % | WEIGHT: 164.8 LBS

## 2023-10-17 DIAGNOSIS — F43.21 SITUATIONAL DEPRESSION: Primary | ICD-10-CM

## 2023-10-17 DIAGNOSIS — Z00.00 MEDICARE ANNUAL WELLNESS VISIT, INITIAL: ICD-10-CM

## 2023-10-17 DIAGNOSIS — Z23 ENCOUNTER FOR IMMUNIZATION: ICD-10-CM

## 2023-10-17 DIAGNOSIS — Z12.11 SCREENING FOR COLON CANCER: ICD-10-CM

## 2023-10-17 PROCEDURE — G0438 PPPS, INITIAL VISIT: HCPCS | Performed by: PHYSICIAN ASSISTANT

## 2023-10-17 PROCEDURE — 99213 OFFICE O/P EST LOW 20 MIN: CPT | Performed by: PHYSICIAN ASSISTANT

## 2023-10-17 PROCEDURE — G0008 ADMIN INFLUENZA VIRUS VAC: HCPCS | Performed by: PHYSICIAN ASSISTANT

## 2023-10-17 PROCEDURE — 90662 IIV NO PRSV INCREASED AG IM: CPT | Performed by: PHYSICIAN ASSISTANT

## 2023-10-17 RX ORDER — MELATONIN
2000 DAILY
COMMUNITY

## 2023-10-17 NOTE — PROGRESS NOTES
Assessment and Plan:     Problem List Items Addressed This Visit     Situational depression - Primary     Discussed trial of celexa. Pt will consider and let us know if she would like to pursue. Other Visit Diagnoses     Screening for colon cancer        Relevant Orders    Ambulatory Referral to Gastroenterology    Encounter for immunization        Relevant Orders    influenza vaccine, high-dose, PF 0.7 mL (FLUZONE HIGH-DOSE) (Completed)          Depression Screening and Follow-up Plan: Patient's depression screening was positive with a PHQ-2 score of 4. Their PHQ-9 score was 9. Continue regular follow-up with their mental health provider who is managing their mental health condition(s). Preventive health issues were discussed with patient, and age appropriate screening tests were ordered as noted in patient's After Visit Summary. Personalized health advice and appropriate referrals for health education or preventive services given if needed, as noted in patient's After Visit Summary. History of Present Illness:     Patient presents for a Medicare Wellness Visit    Pt presents for routine visit. Initial AWV also performed. She is up to date with labs and mammogram. She is due for CRC screening. She desires flu vaccine. She notes some anxiety and depression related to situational stressors. Her sister is very sick and declining fairly quickly. She has notes the loss of 5 people in the last year and this has been very emotional for her. We discussed trial of celexa but she would like to think about it first.        Patient Care Team:  Jeffrey Telles PA-C as PCP - General (Family Medicine)     Review of Systems:     Review of Systems   Constitutional:  Negative for chills and fever. HENT:  Negative for congestion, ear pain, hearing loss, postnasal drip, rhinorrhea, sinus pressure, sinus pain, sore throat and trouble swallowing. Eyes:  Negative for pain and visual disturbance.    Respiratory: Negative for cough, chest tightness, shortness of breath and wheezing. Cardiovascular: Negative. Negative for chest pain, palpitations and leg swelling. Gastrointestinal:  Negative for abdominal pain, blood in stool, constipation, diarrhea, nausea and vomiting. Endocrine: Negative for cold intolerance, heat intolerance, polydipsia, polyphagia and polyuria. Genitourinary:  Negative for difficulty urinating, dysuria, flank pain and urgency. Musculoskeletal:  Negative for arthralgias, back pain, gait problem and myalgias. Skin:  Negative for rash. Allergic/Immunologic: Negative. Neurological:  Negative for dizziness, weakness, light-headedness and headaches. Hematological: Negative. Psychiatric/Behavioral:  Positive for dysphoric mood. Negative for behavioral problems and sleep disturbance. The patient is not nervous/anxious.          Problem List:     Patient Active Problem List   Diagnosis   • Other fatigue   • Situational depression      Past Medical and Surgical History:     Past Medical History:   Diagnosis Date   • Sciatica    • Scoliosis      Past Surgical History:   Procedure Laterality Date   • ID CMBND ANTERPOST COLPORRAPHY W/CYSTO N/A 12/3/2018    Procedure: ANT POST COLPORRHAPHY;  Surgeon: Sally Zendejas MD;  Location: [facility];  Service: UroGynecology          • ID COLPOPEXY VAGINAL EXTRAPERITONEAL APPROACH N/A 12/3/2018    Procedure: ANT COLPOPEXY, VEC;  Surgeon: Sally Zendejas MD;  Location: [facility];  Service: UroGynecology          • ID CYSTOURETHROSCOPY N/A 12/3/2018    Procedure: Matilde Linton;  Surgeon: Sally Zendejas MD;  Location[facility];  Service: UroGynecology          • ID SLING OPERATION STRESS INCONTINENCE N/A 12/3/2018    Procedure: Kailee Lundberg;  Surgeon: Sally Zendejas MD;  Location: [facility];  Service: UroGynecology          • TUBAL LIGATION        Family History:     Family History   Problem Relation Age of Onset   • No Known Problems Mother    • No Known Problems Father    • Skin cancer Sister    • Lung cancer Sister    • No Known Problems Sister    • No Known Problems Sister    • No Known Problems Maternal Grandmother    • No Known Problems Paternal Grandmother    • Breast cancer Maternal Aunt    • No Known Problems Maternal Aunt    • No Known Problems Maternal Aunt    • No Known Problems Maternal Aunt    • No Known Problems Maternal Aunt       Social History:     Social History     Socioeconomic History   • Marital status: Single     Spouse name: None   • Number of children: None   • Years of education: None   • Highest education level: None   Occupational History   • None   Tobacco Use   • Smoking status: Never   • Smokeless tobacco: Never   Vaping Use   • Vaping Use: Never used   Substance and Sexual Activity   • Alcohol use: Not Currently     Comment: occasionally    • Drug use: No   • Sexual activity: Yes     Partners: Male   Other Topics Concern   • None   Social History Narrative   • None     Social Determinants of Health     Financial Resource Strain: Not on file   Food Insecurity: Not on file   Transportation Needs: Not on file   Physical Activity: Not on file   Stress: Not on file   Social Connections: Not on file   Intimate Partner Violence: Not on file   Housing Stability: Not on file      Medications and Allergies:     Current Outpatient Medications   Medication Sig Dispense Refill   • Ascorbic Acid (vitamin C) 100 MG tablet Take 100 mg by mouth daily     • azelastine (ASTELIN) 0.1 % nasal spray 1 spray into each nostril 2 (two) times a day 30 mL 11   • Calcium Carbonate (CALCIUM 600 PO) Take 2 tablets by mouth daily     • cholecalciferol (VITAMIN D3) 1,000 units tablet Take 2,000 Units by mouth daily     • ibuprofen (MOTRIN) 600 mg tablet Take 1 tablet (600 mg total) by mouth every 6 (six) hours as needed for mild pain 30 tablet 0   • Multiple Vitamins-Minerals (CENTRUM ADULTS PO) Take by mouth       No current facility-administered medications for this visit. No Known Allergies   Immunizations:     Immunization History   Administered Date(s) Administered   • COVID-19 MODERNA VACC 0.5 ML IM 08/08/2021, 09/05/2021   • INFLUENZA 11/06/2020   • Influenza, high dose seasonal 0.7 mL 10/17/2023   • Influenza, injectable, quadrivalent, preservative free 0.5 mL 11/06/2020   • Pneumococcal Conjugate Vaccine 20-valent (Pcv20), Polysace 04/18/2023      Health Maintenance:         Topic Date Due   • HIV Screening  Never done   • Colorectal Cancer Screening  Never done   • Breast Cancer Screening: Mammogram  06/15/2024   • Hepatitis C Screening  Completed         Topic Date Due   • COVID-19 Vaccine (3 - Angelina Polka series) 10/31/2021      Medicare Screening Tests and Risk Assessments:     Reyna Helton is here for her Subsequent Wellness visit. Last Medicare Wellness visit information reviewed, patient interviewed and updates made to the record today. Health Risk Assessment:   Patient rates overall health as good. Patient feels that their physical health rating is same. Patient is satisfied with their life. Eyesight was rated as slightly worse. Hearing was rated as same. Patient feels that their emotional and mental health rating is slightly worse. Patients states they are never, rarely angry. Patient states they are sometimes unusually tired/fatigued. Pain experienced in the last 7 days has been none. Patient states that she has experienced no weight loss or gain in last 6 months. Depression Screening:   PHQ-2 Score: 4  PHQ-9 Score: 9      Fall Risk Screening: In the past year, patient has experienced: no history of falling in past year      Urinary Incontinence Screening:   Patient has leaked urine accidently in the last six months. Home Safety:  Patient does not have trouble with stairs inside or outside of their home. Patient has working smoke alarms and has working carbon monoxide detector. Home safety hazards include: none.      Nutrition:   Current diet is Regular. Medications:   Patient is currently taking over-the-counter supplements. OTC medications include: see medication list. Patient is able to manage medications. Activities of Daily Living (ADLs)/Instrumental Activities of Daily Living (IADLs):   Walk and transfer into and out of bed and chair?: Yes  Dress and groom yourself?: Yes    Bathe or shower yourself?: Yes    Feed yourself? Yes  Do your laundry/housekeeping?: Yes  Manage your money, pay your bills and track your expenses?: Yes  Make your own meals?: Yes    Do your own shopping?: Yes    Previous Hospitalizations:   Any hospitalizations or ED visits within the last 12 months?: No      Advance Care Planning:   Living will: No    Durable POA for healthcare: No    Advanced directive: No      PREVENTIVE SCREENINGS      Cardiovascular Screening:    General: Screening Current      Diabetes Screening:     General: Screening Current      Colorectal Cancer Screening:       Due for: Colonoscopy - Low Risk      Breast Cancer Screening:     General: Screening Current      Cervical Cancer Screening:    General: Screening Not Indicated      Osteoporosis Screening:    General: Screening Not Indicated and History Osteoporosis      Abdominal Aortic Aneurysm (AAA) Screening:        General: Screening Not Indicated      Lung Cancer Screening:     General: Screening Not Indicated      Hepatitis C Screening:    General: Screening Current    Screening, Brief Intervention, and Referral to Treatment (SBIRT)    Screening  Typical number of drinks in a day: 0  Typical number of drinks in a week: 0  Interpretation: Low risk drinking behavior. Single Item Drug Screening:  How often have you used an illegal drug (including marijuana) or a prescription medication for non-medical reasons in the past year? never    Single Item Drug Screen Score: 0  Interpretation: Negative screen for possible drug use disorder    No results found.      Physical Exam:     /80   Pulse 84 Temp 97.5 °F (36.4 °C)   Resp 18   Ht 5' 5.5" (1.664 m)   Wt 74.8 kg (164 lb 12.8 oz)   SpO2 99%   BMI 27.01 kg/m²     Physical Exam  Vitals and nursing note reviewed. Constitutional:       Appearance: She is well-developed. HENT:      Head: Normocephalic and atraumatic. Right Ear: External ear normal.      Left Ear: External ear normal.      Nose: Nose normal.   Eyes:      Conjunctiva/sclera: Conjunctivae normal.   Cardiovascular:      Rate and Rhythm: Normal rate and regular rhythm. Heart sounds: Normal heart sounds. Pulmonary:      Effort: Pulmonary effort is normal.      Breath sounds: Normal breath sounds. Abdominal:      General: Bowel sounds are normal.      Palpations: Abdomen is soft. Musculoskeletal:         General: Normal range of motion. Cervical back: Normal range of motion and neck supple. Skin:     General: Skin is warm and dry. Neurological:      Mental Status: She is alert and oriented to person, place, and time. Psychiatric:         Mood and Affect: Mood is depressed. Affect is tearful. Behavior: Behavior normal.         Thought Content:  Thought content normal.         Judgment: Judgment normal.          Miguelina Peterson PA-C

## 2023-10-17 NOTE — PATIENT INSTRUCTIONS
Medicare Preventive Visit Patient Instructions  Thank you for completing your Welcome to Medicare Visit or Medicare Annual Wellness Visit today. Your next wellness visit will be due in one year (10/17/2024). The screening/preventive services that you may require over the next 5-10 years are detailed below. Some tests may not apply to you based off risk factors and/or age. Screening tests ordered at today's visit but not completed yet may show as past due. Also, please note that scanned in results may not display below. Preventive Screenings:  Service Recommendations Previous Testing/Comments   Colorectal Cancer Screening  * Colonoscopy    * Fecal Occult Blood Test (FOBT)/Fecal Immunochemical Test (FIT)  * Fecal DNA/Cologuard Test  * Flexible Sigmoidoscopy Age: 43-73 years old   Colonoscopy: every 10 years (may be performed more frequently if at higher risk)  OR  FOBT/FIT: every 1 year  OR  Cologuard: every 3 years  OR  Sigmoidoscopy: every 5 years  Screening may be recommended earlier than age 39 if at higher risk for colorectal cancer. Also, an individualized decision between you and your healthcare provider will decide whether screening between the ages of 77-80 would be appropriate. Colonoscopy: Not on file  FOBT/FIT: Not on file  Cologuard: Not on file  Sigmoidoscopy: Not on file          Breast Cancer Screening Age: 36 years old  Frequency: every 1-2 years  Not required if history of left and right mastectomy Mammogram: 06/15/2023    Screening Current   Cervical Cancer Screening Between the ages of 21-29, pap smear recommended once every 3 years. Between the ages of 32-69, can perform pap smear with HPV co-testing every 5 years.    Recommendations may differ for women with a history of total hysterectomy, cervical cancer, or abnormal pap smears in past. Pap Smear: Not on file    Screening Not Indicated   Hepatitis C Screening Once for adults born between 1945 and 1965  More frequently in patients at high risk for Hepatitis C Hep C Antibody: 04/18/2023    Screening Current   Diabetes Screening 1-2 times per year if you're at risk for diabetes or have pre-diabetes Fasting glucose: 88 mg/dL (4/18/2023)  A1C: No results in last 5 years (No results in last 5 years)  Screening Current   Cholesterol Screening Once every 5 years if you don't have a lipid disorder. May order more often based on risk factors. Lipid panel: 04/18/2023    Screening Current     Other Preventive Screenings Covered by Medicare:  Abdominal Aortic Aneurysm (AAA) Screening: covered once if your at risk. You're considered to be at risk if you have a family history of AAA. Lung Cancer Screening: covers low dose CT scan once per year if you meet all of the following conditions: (1) Age 48-67; (2) No signs or symptoms of lung cancer; (3) Current smoker or have quit smoking within the last 15 years; (4) You have a tobacco smoking history of at least 20 pack years (packs per day multiplied by number of years you smoked); (5) You get a written order from a healthcare provider. Glaucoma Screening: covered annually if you're considered high risk: (1) You have diabetes OR (2) Family history of glaucoma OR (3)  aged 48 and older OR (3)  American aged 72 and older  Osteoporosis Screening: covered every 2 years if you meet one of the following conditions: (1) You're estrogen deficient and at risk for osteoporosis based off medical history and other findings; (2) Have a vertebral abnormality; (3) On glucocorticoid therapy for more than 3 months; (4) Have primary hyperparathyroidism; (5) On osteoporosis medications and need to assess response to drug therapy. Last bone density test (DXA Scan): 06/15/2023. HIV Screening: covered annually if you're between the age of 14-79. Also covered annually if you are younger than 13 and older than 72 with risk factors for HIV infection.  For pregnant patients, it is covered up to 3 times per documents that state your wishes and plans for medical care. These plans are made ahead of time in case you lose your ability to make decisions for yourself. Advance directives can apply to any medical decision, such as the treatments you want, and if you want to donate organs. What are the types of advance directives? There are many types of advance directives, and each state has rules about how to use them. You may choose a combination of any of the following:  Living will: This is a written record of the treatment you want. You can also choose which treatments you do not want, which to limit, and which to stop at a certain time. This includes surgery, medicine, IV fluid, and tube feedings. Durable power of  for [facility]): This is a written record that states who you want to make healthcare choices for you when you are unable to make them for yourself. This person, called a proxy, is usually a family member or a friend. You may choose more than 1 proxy. Do not resuscitate (DNR) order:  A DNR order is used in case your heart stops beating or you stop breathing. It is a request not to have certain forms of treatment, such as CPR. A DNR order may be included in other types of advance directives. Medical directive: This covers the care that you want if you are in a coma, near death, or unable to make decisions for yourself. You can list the treatments you want for each condition. Treatment may include pain medicine, surgery, blood transfusions, dialysis, IV or tube feedings, and a ventilator (breathing machine). Values history: This document has questions about your views, beliefs, and how you feel and think about life. This information can help others choose the care that you would choose. Why are advance directives important? An advance directive helps you control your care. Although spoken wishes may be used, it is better to have your wishes written down.  Spoken wishes can be misunderstood, or not followed. Treatments may be given even if you do not want them. An advance directive may make it easier for your family to make difficult choices about your care. Depression   Depression  is a medical condition that causes feelings of sadness or hopelessness that do not go away. Depression may cause you to lose interest in things you used to enjoy. These feelings may interfere with your daily life. Call your local emergency number (911 in the [address]) if:   You think about harming yourself or someone else. You have done something on purpose to hurt yourself. The following resources are available at any time to help you, if needed:   [place]: 5-122-686-303.467.4675 (7-276-676-SEVERO)   Suicide Hotline: 4-657.575.1772 (7-120-FLCHWAR)   For a list of international numbers: https://[employer]org/find-help/international-resources/  Treatment for depression may include medicine to relieve depression. Medicine is often used together with therapy. Therapy is a way for you to talk about your feelings and anything that may be causing depression. Therapy can be done alone or in a group. It may also be done with family members or a significant other. Get regular physical activity. Create a regular sleep schedule. Eat a variety of healthy foods. Do not drink alcohol or use drugs. Urinary Incontinence   Urinary incontinence (UI)  is when you lose control of your bladder. UI develops because your bladder cannot store or empty urine properly. The 3 most common types of UI are stress incontinence, urge incontinence, or both. Medicines:   May be given to help strengthen your bladder control. Report any side effects of medication to your healthcare provider. Do pelvic muscle exercises often:  Your pelvic muscles help you stop urinating. Squeeze these muscles tight for 5 seconds, then relax for 5 seconds. Gradually work up to squeezing for 10 seconds.  Do 3 sets of 15 repetitions a day, or as directed. This will help strengthen your pelvic muscles and improve bladder control. Train your bladder:  Go to the bathroom at set times, such as every 2 hours, even if you do not feel the urge to go. You can also try to hold your urine when you feel the urge to go. For example, hold your urine for 5 minutes when you feel the urge to go. As that becomes easier, hold your urine for 10 minutes. Self-care:   Keep a UI record. Write down how often you leak urine and how much you leak. Make a note of what you were doing when you leaked urine. Drink liquids as directed. You may need to limit the amount of liquid you drink to help control your urine leakage. Do not drink any liquid right before you go to bed. Limit or do not have drinks that contain caffeine or alcohol. Prevent constipation. Eat a variety of high-fiber foods. Good examples are high-fiber cereals, beans, vegetables, and whole-grain breads. Walking is the best way to trigger your intestines to have a bowel movement. Exercise regularly and maintain a healthy weight. Weight loss and exercise will decrease pressure on your bladder and help you control your leakage. Use a catheter as directed  to help empty your bladder. A catheter is a tiny, plastic tube that is put into your bladder to drain your urine. Go to behavior therapy as directed. Behavior therapy may be used to help you learn to control your urge to urinate. Weight Management   Why it is important to manage your weight:  Being overweight increases your risk of health conditions such as heart disease, high blood pressure, type 2 diabetes, and certain types of cancer. It can also increase your risk for osteoarthritis, sleep apnea, and other respiratory problems. Aim for a slow, steady weight loss. Even a small amount of weight loss can lower your risk of health problems.   How to lose weight safely:  A safe and healthy way to lose weight is to eat fewer calories and get regular exercise. You can lose up about 1 pound a week by decreasing the number of calories you eat by 500 calories each day. Healthy meal plan for weight management:  A healthy meal plan includes a variety of foods, contains fewer calories, and helps you stay healthy. A healthy meal plan includes the following:  Eat whole-grain foods more often. A healthy meal plan should contain fiber. Fiber is the part of grains, fruits, and vegetables that is not broken down by your body. Whole-grain foods are healthy and provide extra fiber in your diet. Some examples of whole-grain foods are whole-wheat breads and pastas, oatmeal, brown rice, and bulgur. Eat a variety of vegetables every day. Include dark, leafy greens such as spinach, kale, atiya greens, and mustard greens. Eat yellow and orange vegetables such as carrots, sweet potatoes, and winter squash. Eat a variety of fruits every day. Choose fresh or canned fruit (canned in its own juice or light syrup) instead of juice. Fruit juice has very little or no fiber. Eat low-fat dairy foods. Drink fat-free (skim) milk or 1% milk. Eat fat-free yogurt and low-fat cottage cheese. Try low-fat cheeses such as mozzarella and other reduced-fat cheeses. Choose meat and other protein foods that are low in fat. Choose beans or other legumes such as split peas or lentils. Choose fish, skinless poultry (chicken or turkey), or lean cuts of red meat (beef or pork). Before you cook meat or poultry, cut off any visible fat. Use less fat and oil. Try baking foods instead of frying them. Add less fat, such as margarine, sour cream, regular salad dressing and mayonnaise to foods. Eat fewer high-fat foods. Some examples of high-fat foods include french fries, doughnuts, ice cream, and cakes. Eat fewer sweets. Limit foods and drinks that are high in sugar. This includes candy, cookies, regular soda, and sweetened drinks.   Exercise:  Exercise at least 30 minutes per day on most days of the week. Some examples of exercise include walking, biking, dancing, and swimming. You can also fit in more physical activity by taking the stairs instead of the elevator or parking farther away from stores. Ask your healthcare provider about the best exercise plan for you. © Copyright [employer] 2018 Information is for End User's use only and may not be sold, redistributed or otherwise used for commercial purposes.  All illustrations and images included in CareNotes® are the copyrighted property of [employer] or  [employer]

## 2023-10-30 ENCOUNTER — OFFICE VISIT (OUTPATIENT)
Dept: URGENT CARE | Facility: CLINIC | Age: 63
End: 2023-10-30
Payer: MEDICARE

## 2023-10-30 VITALS
SYSTOLIC BLOOD PRESSURE: 133 MMHG | TEMPERATURE: 98.1 F | RESPIRATION RATE: 18 BRPM | HEART RATE: 84 BPM | OXYGEN SATURATION: 100 % | DIASTOLIC BLOOD PRESSURE: 84 MMHG

## 2023-10-30 DIAGNOSIS — L23.7 POISON IVY DERMATITIS: Primary | ICD-10-CM

## 2023-10-30 PROCEDURE — 99213 OFFICE O/P EST LOW 20 MIN: CPT | Performed by: NURSE PRACTITIONER

## 2023-10-30 PROCEDURE — G0463 HOSPITAL OUTPT CLINIC VISIT: HCPCS | Performed by: NURSE PRACTITIONER

## 2023-10-30 RX ORDER — PREDNISONE 20 MG/1
TABLET ORAL
Qty: 26 TABLET | Refills: 0 | Status: SHIPPED | OUTPATIENT
Start: 2023-10-30

## 2023-10-30 NOTE — PATIENT INSTRUCTIONS
Poison Ivy   WHAT YOU NEED TO KNOW:   What is poison ivy? Poison ivy is a plant that can cause an itchy, uncomfortable rash on your skin. Poison ivy grows as a shrub or vine in woods, fields, and areas of thick [place]. It has 3 bright green leaves on each stem that turn red in zah[place]. What causes a poison ivy rash? A poison ivy rash can occur when the plant oil soaks into your skin. You may get a rash if you touch: Any part of the poison ivy plant: This includes the leaves, stem, vine, roots, flowers, and berries. Pets with poison ivy on their fur:  They can spread poison ivy oil to your skin and to items inside your car and house. Items with poison ivy oil on them: This includes clothing, shoes, camping or sports equipment, or outdoor tools. A person with poison ivy oil on him:  Poison ivy oil may be on their skin or clothing. What can I do if I have been exposed to poison ivy? If you think you have touched poison ivy, rinse your skin with cool water right away. Then, wash it with soap and water. Rinse your skin well. Do not use hot water because it may cause the oil to spread on your skin. You may also put rubbing alcohol or a solution of 1/2 alcohol and 1/2 water on your skin. This may help your rash to be less severe when it breaks out on your skin. What are the signs and symptoms of a poison ivy rash? A red, swollen, itchy rash that develops within hours to days of exposure to poison ivy    A rash that appears in thick patches or thin lines where the plant leaves rubbed against your skin    Blisters that may leak clear to yellow liquid, then crust over and become scaly    How is a poison ivy rash treated? Antiseptic or drying creams or ointments:  Your healthcare provider may recommend medicine to dry out the rash and decrease the itching. These products may be available without a doctor's order. Steroids: This medicine helps decrease itching and inflammation.  It can be given as a cream to apply to your skin or as a pill. Antihistamines: This medicine may help decrease itching and help you sleep. It is available without a doctor's order. How can I manage my symptoms? Keep your rash clean and dry:  Wash it with soap and water. Gently pat it dry with a clean towel. Try not to scratch or rub your rash: This can cause your skin to become infected. Use a compress on your rash:  Dip a clean washcloth in cool water. Wring it out and place it on your rash. Leave the washcloth on your skin for 15 minutes. Do this at least 3 times per day. Take a cornstarch or oatmeal bath: If your rash is too large to cover with wet washcloths, take 3 or 4 cornstarch baths daily. Mix 1 pound of cornstarch with a little water to make a paste. Add the paste to a tub full of water and mix well. You may also use colloidal oatmeal in the bath water. Use lukewarm water. Avoid hot water because it may cause your itching to increase. Can a poison ivy rash be spread by scratching or touching it? You cannot spread poison ivy by touching your rash or the liquid from your blisters. Poison ivy is spread only if you scratch your skin while it still has oil on it. You may think your rash is spreading because new rashes appear over a number of days. This happens because areas covered by thin skin break out in a rash first. Your face or forearms may develop a rash before thicker areas, such as the palms of your hands. How can I prevent a poison ivy rash? Wear skin protection:  Wear long pants, a long-sleeved shirt, and gloves. Use a skin block lotion to protect your skin from poison ivy oil. You can find this at a drugstore without a prescription. Wash clothing after possible exposure: If you think you have been near a poison ivy plant, wash the clothes you were wearing separately from other clothes. Rinse the washing machine well after you take the clothes out.  Scrub boots and shoes with warm, soapy water. Dry clean items and clothing that you cannot wash in water. Poison ivy oil is sticky and can stay on surfaces for a long time. It can cause a new rash even years later. Bathe your pet:  Use warm water and shampoo on your pet's fur. This will prevent the spread of oil to your skin, car, and home. Wear long sleeves, long pants, and gloves while washing pets or any items that may have oil on them. Reduce exposure to poison ivy:  Do not touch plants that look like poison ivy. Keep your yard free of poison ivy. While protecting your skin, remove the plant and the roots. Place them in a plastic bag and seal the bag tightly. Do not burn poison ivy plants: This can spread the oil through the air. If you breathe the oil into your lungs, you could have swelling and serious breathing problems. Oil that clings to the fire adriel can land on your skin and cause a rash. When should I contact my healthcare provider? You have pus, soft yellow scabs, or tenderness on the rash. The itching gets worse or keeps you awake at night. The rash covers more than 1/4 of your skin or spreads to your eyes, mouth, or genital area. The rash is not better after 2 to 3 weeks. You have tender, swollen glands on the sides of your neck. You have swelling in your arms and legs. You have questions or concerns about your condition or care. When should I seek immediate care or call 911? You have a fever. You have redness, swelling, and tenderness around the rash. You have trouble breathing. CARE AGREEMENT:   You have the right to help plan your care. Learn about your health condition and how it may be treated. Discuss treatment options with your healthcare providers to decide what care you want to receive. You always have the right to refuse treatment. The above information is an  only. It is not intended as medical advice for individual conditions or treatments.  Talk to your doctor, nurse or pharmacist before following any medical regimen to see if it is safe and effective for you. © Copyright Tamiko Borges 2023 Information is for End User's use only and may not be sold, redistributed or otherwise used for commercial purposes.

## 2023-10-30 NOTE — PROGRESS NOTES
[facility]        NAME: Josefina Finch is a 70 y.o. female  : 1960    MRN: [id number]  DATE: 2023  TIME: 2:58 PM      Assessment and Plan     Poison ivy dermatitis [L23.7]  1. Poison ivy dermatitis  predniSONE 20 mg tablet            Patient Instructions     Patient Instructions   Poison Ivy   WHAT YOU NEED TO KNOW:   What is poison ivy? Poison ivy is a plant that can cause an itchy, uncomfortable rash on your skin. Poison ivy grows as a shrub or vine in woods, fields, and areas of thick [place]. It has 3 bright green leaves on each stem that turn red in [place]. What causes a poison ivy rash? A poison ivy rash can occur when the plant oil soaks into your skin. You may get a rash if you touch: Any part of the poison ivy plant: This includes the leaves, stem, vine, roots, flowers, and berries. Pets with poison ivy on their fur:  They can spread poison ivy oil to your skin and to items inside your car and house. Items with poison ivy oil on them: This includes clothing, shoes, camping or sports equipment, or outdoor tools. A person with poison ivy oil on him:  Poison ivy oil may be on their skin or clothing. What can I do if I have been exposed to poison ivy? If you think you have touched poison ivy, rinse your skin with cool water right away. Then, wash it with soap and water. Rinse your skin well. Do not use hot water because it may cause the oil to spread on your skin. You may also put rubbing alcohol or a solution of 1/2 alcohol and 1/2 water on your skin. This may help your rash to be less severe when it breaks out on your skin. What are the signs and symptoms of a poison ivy rash?    A red, swollen, itchy rash that develops within hours to days of exposure to poison ivy    A rash that appears in thick patches or thin lines where the plant leaves rubbed against your skin    Blisters that may leak clear to yellow liquid, then crust over and become scaly    How is a poison ivy rash treated? Antiseptic or drying creams or ointments:  Your healthcare provider may recommend medicine to dry out the rash and decrease the itching. These products may be available without a doctor's order. Steroids: This medicine helps decrease itching and inflammation. It can be given as a cream to apply to your skin or as a pill. Antihistamines: This medicine may help decrease itching and help you sleep. It is available without a doctor's order. How can I manage my symptoms? Keep your rash clean and dry:  Wash it with soap and water. Gently pat it dry with a clean towel. Try not to scratch or rub your rash: This can cause your skin to become infected. Use a compress on your rash:  Dip a clean washcloth in cool water. Wring it out and place it on your rash. Leave the washcloth on your skin for 15 minutes. Do this at least 3 times per day. Take a cornstarch or oatmeal bath: If your rash is too large to cover with wet washcloths, take 3 or 4 cornstarch baths daily. Mix 1 pound of cornstarch with a little water to make a paste. Add the paste to a tub full of water and mix well. You may also use colloidal oatmeal in the bath water. Use lukewarm water. Avoid hot water because it may cause your itching to increase. Can a poison ivy rash be spread by scratching or touching it? You cannot spread poison ivy by touching your rash or the liquid from your blisters. Poison ivy is spread only if you scratch your skin while it still has oil on it. You may think your rash is spreading because new rashes appear over a number of days. This happens because areas covered by thin skin break out in a rash first. Your face or forearms may develop a rash before thicker areas, such as the palms of your hands. How can I prevent a poison ivy rash? Wear skin protection:  Wear long pants, a long-sleeved shirt, and gloves. Use a skin block lotion to protect your skin from poison ivy oil. You can find this at a drugstore without a prescription. Wash clothing after possible exposure: If you think you have been near a poison ivy plant, wash the clothes you were wearing separately from other clothes. Rinse the washing machine well after you take the clothes out. Scrub boots and shoes with warm, soapy water. Dry clean items and clothing that you cannot wash in water. Poison ivy oil is sticky and can stay on surfaces for a long time. It can cause a new rash even years later. Bathe your pet:  Use warm water and shampoo on your pet's fur. This will prevent the spread of oil to your skin, car, and home. Wear long sleeves, long pants, and gloves while washing pets or any items that may have oil on them. Reduce exposure to poison ivy:  Do not touch plants that look like poison ivy. Keep your yard free of poison ivy. While protecting your skin, remove the plant and the roots. Place them in a plastic bag and seal the bag tightly. Do not burn poison ivy plants: This can spread the oil through the air. If you breathe the oil into your lungs, you could have swelling and serious breathing problems. Oil that clings to the fire adriel can land on your skin and cause a rash. When should I contact my healthcare provider? You have pus, soft yellow scabs, or tenderness on the rash. The itching gets worse or keeps you awake at night. The rash covers more than 1/4 of your skin or spreads to your eyes, mouth, or genital area. The rash is not better after 2 to 3 weeks. You have tender, swollen glands on the sides of your neck. You have swelling in your arms and legs. You have questions or concerns about your condition or care. When should I seek immediate care or call 911? You have a fever. You have redness, swelling, and tenderness around the rash. You have trouble breathing. CARE AGREEMENT:   You have the right to help plan your care.  Learn about your health condition and how it may be treated. Discuss treatment options with your healthcare providers to decide what care you want to receive. You always have the right to refuse treatment. The above information is an  only. It is not intended as medical advice for individual conditions or treatments. Talk to your doctor, nurse or pharmacist before following any medical regimen to see if it is safe and effective for you. © Copyright Ryan Abts 2023 Information is for End User's use only and may not be sold, redistributed or otherwise used for commercial purposes. Follow up with PCP in 3-5 days. Proceed to  ER if symptoms worsen. Chief Complaint     Chief Complaint   Patient presents with    Rash     On neck and back was cutting down bushes in yard          History of Present Illness     Worsening poison ivy rash since Friday--neck, back, lower abdomen, groin, legs ever since trimming pushes. Has tried diluted bleach, caladryl, benadryl, etc without relief. Review of Systems     Review of Systems   Skin:  Positive for rash. All other systems reviewed and are negative.         Current Medications       Current Outpatient Medications:     predniSONE 20 mg tablet, 3 tabs daily x 4 days, 2 tabs daily x 4 days, 1 tab daily x 4 days, 1/2 tab daily x 4 days, Disp: 26 tablet, Rfl: 0    Ascorbic Acid (vitamin C) 100 MG tablet, Take 100 mg by mouth daily, Disp: , Rfl:     azelastine (ASTELIN) 0.1 % nasal spray, 1 spray into each nostril 2 (two) times a day, Disp: 30 mL, Rfl: 11    Calcium Carbonate (CALCIUM 600 PO), Take 2 tablets by mouth daily, Disp: , Rfl:     cholecalciferol (VITAMIN D3) 1,000 units tablet, Take 2,000 Units by mouth daily, Disp: , Rfl:     ibuprofen (MOTRIN) 600 mg tablet, Take 1 tablet (600 mg total) by mouth every 6 (six) hours as needed for mild pain, Disp: 30 tablet, Rfl: 0    Multiple Vitamins-Minerals (CENTRUM ADULTS PO), Take by mouth, Disp: , Rfl:     Current Allergies     Allergies as of 10/30/2023    (No Known Allergies)              The following portions of the patient's history were reviewed and updated as appropriate: allergies, current medications, past family history, past medical history, past social history, past surgical history and problem list.     Past Medical History:   Diagnosis Date    Sciatica     Scoliosis        Past Surgical History:   Procedure Laterality Date    IA CMBND ANTERPOST COLPORRAPHY W/CYSTO N/A 12/3/2018    Procedure: ANT POST COLPORRHAPHY;  Surgeon: Kirsten Zhang MD;  Location: [facility];  Service: UroGynecology           IA COLPOPEXY VAGINAL EXTRAPERITONEAL APPROACH N/A 12/3/2018    Procedure: ANT COLPOPEXY, VEC;  Surgeon: Kirsten Zhang MD;  Location[facility];  Service: UroGynecology           IA CYSTOURETHROSCOPY N/A 12/3/2018    Procedure: Perla Briscoe;  Surgeon: Kirsten Zhang MD;  Location[facility];  Service: UroGynecology           IA SLING OPERATION STRESS INCONTINENCE N/A 12/3/2018    Procedure: Rainer Abram;  Surgeon: Kirsten Zhang MD;  Location: [facility];  Service: UroGynecology           TUBAL LIGATION         Family History   Problem Relation Age of Onset    No Known Problems Mother     No Known Problems Father     Skin cancer Sister     Lung cancer Sister     No Known Problems Sister     No Known Problems Sister     No Known Problems Maternal Grandmother     No Known Problems Paternal Grandmother     Breast cancer Maternal Aunt     No Known Problems Maternal Aunt     No Known Problems Maternal Aunt     No Known Problems Maternal Aunt     No Known Problems Maternal Aunt          Medications have been verified. Objective     /84   Pulse 84   Temp 98.1 °F (36.7 °C)   Resp 18   SpO2 100%   No LMP recorded. Patient is postmenopausal.         Physical Exam     Physical Exam  Vitals and nursing note reviewed. Constitutional:       General: She is not in acute distress. Appearance: Normal appearance.  She is well-developed. She is not ill-appearing, toxic-appearing or diaphoretic. HENT:      Head: Normocephalic and atraumatic. Eyes:      Pupils: Pupils are equal, round, and reactive to light. Pulmonary:      Effort: Pulmonary effort is normal. No respiratory distress. Abdominal:      General: There is no distension. Palpations: Abdomen is soft. Musculoskeletal:         General: Normal range of motion. Cervical back: Normal range of motion and neck supple. Skin:     General: Skin is warm and dry. Capillary Refill: Capillary refill takes less than 2 seconds. Findings: Rash present. Rash is macular and papular. Comments: Poison ivy rash, linear in spots, crusted areas (likely from the bleach)   Neurological:      General: No focal deficit present. Mental Status: She is alert and oriented to person, place, and time. Psychiatric:         Mood and Affect: Mood and affect normal.         Behavior: Behavior normal. Behavior is cooperative. Thought Content:  Thought content normal.         Judgment: Judgment normal.

## 2023-12-11 ENCOUNTER — TELEPHONE (OUTPATIENT)
Dept: INTERNAL MEDICINE CLINIC | Facility: CLINIC | Age: 63
End: 2023-12-11

## 2024-02-12 ENCOUNTER — TELEPHONE (OUTPATIENT)
Dept: INTERNAL MEDICINE CLINIC | Facility: CLINIC | Age: 64
End: 2024-02-12

## 2024-02-12 DIAGNOSIS — J01.10 ACUTE NON-RECURRENT FRONTAL SINUSITIS: Primary | ICD-10-CM

## 2024-02-12 RX ORDER — AMOXICILLIN AND CLAVULANATE POTASSIUM 875; 125 MG/1; MG/1
1 TABLET, FILM COATED ORAL EVERY 12 HOURS SCHEDULED
Qty: 20 TABLET | Refills: 0 | Status: SHIPPED | OUTPATIENT
Start: 2024-02-12 | End: 2024-02-22

## 2024-02-12 NOTE — TELEPHONE ENCOUNTER
Patient states that she has a lot of sinus congestion and a headache feels like her glands are swollen, has left ear pain and has had a low grade fever at 99. It started on Thursday. Patient did covid test and they came back negative 2 times she tested Wednesday and Friday

## 2024-02-12 NOTE — TELEPHONE ENCOUNTER
This is Fara coaching [phone]. I was wondering if I have a sinus infection, if she could send the prescription in for me. Rosario Hernandez. Thank you.

## 2024-02-15 ENCOUNTER — TELEPHONE (OUTPATIENT)
Dept: INTERNAL MEDICINE CLINIC | Facility: CLINIC | Age: 64
End: 2024-02-15

## 2024-02-15 NOTE — TELEPHONE ENCOUNTER
I had recommended it, last msg states she was going to look into it and let us know if she wanted to proceed

## 2024-02-15 NOTE — TELEPHONE ENCOUNTER
Patient called and wanted to know if she should be taking prolia. Please advise on this so I can get her started

## 2024-02-16 NOTE — TELEPHONE ENCOUNTER
Patient called and state she is feeling better but just the cough seems like it is getting worse she is taking allegra and cough is still bad and she states she taste her sinus.

## 2024-04-23 ENCOUNTER — TELEPHONE (OUTPATIENT)
Age: 64
End: 2024-04-23

## 2024-04-23 DIAGNOSIS — J30.2 SEASONAL ALLERGIES: Primary | ICD-10-CM

## 2024-04-23 RX ORDER — MONTELUKAST SODIUM 10 MG/1
10 TABLET ORAL
Qty: 30 TABLET | Refills: 5 | Status: SHIPPED | OUTPATIENT
Start: 2024-04-23

## 2024-04-23 NOTE — TELEPHONE ENCOUNTER
I sent in an RX for singulair for her. She should take this daily for the next few weeks then she can discontinue once feeling better

## 2024-04-23 NOTE — TELEPHONE ENCOUNTER
Patient called stating she is having bad post nasal drip and runny nose. She is currently taking Allegra 24 hr (Equate brand), Azelastine nasal spray. Doesn't know what to do.

## 2024-04-24 NOTE — TELEPHONE ENCOUNTER
Left detailed message for patient. Informed to call the office with any further questions or concerns.

## 2024-05-21 ENCOUNTER — TELEPHONE (OUTPATIENT)
Age: 64
End: 2024-05-21

## 2024-05-21 NOTE — TELEPHONE ENCOUNTER
GI referral was already placed at the end of 2023. We cannot order colonoscopy or EGD, all need to start with GI referral and then the patient is scheduled for those based on the GI doctor opinions. Singular max dose is once daily. Is she having any wheezing or tightness?

## 2024-05-21 NOTE — TELEPHONE ENCOUNTER
Message for Domtash need a referral for colonoscopy also can she have a referral for an endoscopy she is unable to swallow her medication feels like it gets stuck in her throat also ask can she take her Singulair more often then usual her allergies have been bothering her sometimes she has a hard time catching her breath can the office call her [phone].

## 2024-05-21 NOTE — TELEPHONE ENCOUNTER
Pt advised of below. She will reach out to her GI doctor to schedule.    In regards to singulair-she states it is working fine. She does not have any issues at this time. She will continue to take it once daily.

## 2024-08-09 ENCOUNTER — TELEPHONE (OUTPATIENT)
Age: 64
End: 2024-08-09

## 2024-08-09 DIAGNOSIS — L24.7 IRRITANT CONTACT DERMATITIS DUE TO PLANTS, EXCEPT FOOD: Primary | ICD-10-CM

## 2024-08-09 RX ORDER — TRIAMCINOLONE ACETONIDE 5 MG/G
CREAM TOPICAL 3 TIMES DAILY
Qty: 30 G | Refills: 0 | Status: SHIPPED | OUTPATIENT
Start: 2024-08-09 | End: 2024-08-15 | Stop reason: SDUPTHER

## 2024-08-09 NOTE — TELEPHONE ENCOUNTER
Patient called stating she has sumac poisoning on her face. She is asking if Miguelina Peterson can suggest anything to help it.   [facility] [address] [place]  Please advise patient

## 2024-08-15 DIAGNOSIS — L24.7 IRRITANT CONTACT DERMATITIS DUE TO PLANTS, EXCEPT FOOD: ICD-10-CM

## 2024-08-15 RX ORDER — TRIAMCINOLONE ACETONIDE 5 MG/G
CREAM TOPICAL 3 TIMES DAILY
Qty: 45 G | Refills: 0 | Status: SHIPPED | OUTPATIENT
Start: 2024-08-15

## 2024-08-15 NOTE — TELEPHONE ENCOUNTER
Patient called the RX Refill Line. Message is being forwarded to the office.     Patient is requesting a refill of the triamcinolone cream. Said that the poison is mostly gone but she still has spots on her legs that itch. She said that the tube she got last time was very little and is wondering if she can get a bigger size this time. She is all out of this, hopes it can be refilled today to the [facility] on [address]    Please contact patient at

## 2024-08-20 DIAGNOSIS — M81.0 AGE-RELATED OSTEOPOROSIS WITHOUT CURRENT PATHOLOGICAL FRACTURE: ICD-10-CM

## 2024-08-21 DIAGNOSIS — M81.0 AGE-RELATED OSTEOPOROSIS WITHOUT CURRENT PATHOLOGICAL FRACTURE: ICD-10-CM

## 2024-08-23 ENCOUNTER — TELEPHONE (OUTPATIENT)
Age: 64
End: 2024-08-23

## 2024-08-23 NOTE — TELEPHONE ENCOUNTER
Pt return Miguelina mendoza said prolia shot is ready the co pay is over a thousand dollars can the office please advise.[phone]

## 2024-08-27 ENCOUNTER — TELEPHONE (OUTPATIENT)
Age: 64
End: 2024-08-27

## 2024-08-27 ENCOUNTER — TELEPHONE (OUTPATIENT)
Dept: INTERNAL MEDICINE CLINIC | Facility: CLINIC | Age: 64
End: 2024-08-27

## 2024-08-27 NOTE — TELEPHONE ENCOUNTER
Spoke to patient, Patient was informed that she can get medication Prolia delivered to us. After asking Kimberlyn, I informed patient that she would need to call Medicare and find out what is her specialty pharmacy, sign up and then we can get it delivered.

## 2024-08-27 NOTE — TELEPHONE ENCOUNTER
Pt called stating she is scheduled for a prolia injection tomorrow and it would cost her over $1000 out of pocket. Pt spoke to her insurance company and it is covered under Medicare part B but only if delivered to the office.    Warm transfer to [facility] laure Cisneros for further assistance in getting medication sent to the office.    ancef/Followed protocol

## 2024-08-27 NOTE — TELEPHONE ENCOUNTER
Pt called back and cancelled nurse visit for prolia shot scheduled for Wednesday, 8/28/24 due to not receiving the prolia medication yet

## 2024-08-29 DIAGNOSIS — M81.0 AGE-RELATED OSTEOPOROSIS WITHOUT CURRENT PATHOLOGICAL FRACTURE: ICD-10-CM

## 2024-08-30 ENCOUNTER — TELEPHONE (OUTPATIENT)
Age: 64
End: 2024-08-30

## 2024-08-30 DIAGNOSIS — M81.0 AGE-RELATED OSTEOPOROSIS WITHOUT CURRENT PATHOLOGICAL FRACTURE: ICD-10-CM

## 2024-08-30 NOTE — TELEPHONE ENCOUNTER
Celestina from [facility] called us to inform that, pt would like a call back to go over information regarding getting her Prolia shot set up.

## 2024-09-03 ENCOUNTER — TELEPHONE (OUTPATIENT)
Age: 64
End: 2024-09-03

## 2024-09-03 NOTE — TELEPHONE ENCOUNTER
Pt stated that she call pharmacy regarding prolia shot cost over one thousand dollars.pt also called medicare [facility] set up pt with [facility] still over thousand dollars. pt ask what can she do unable to pay for prolia.please advise [phone]

## 2024-09-03 NOTE — TELEPHONE ENCOUNTER
Spoke with patient regarding the cost of the Prolia shot. She said it would cost $1100.  She said when she spoke to Miguelina gonzales, she said there were two other options.  Patient would like to know what those 2 other options are because she can not afford the Prolia injection.    Please advise and notify.    Thank you.

## 2024-09-04 NOTE — TELEPHONE ENCOUNTER
Pt is aware of message going to look into her options if to expensive pt is going to call office back.

## 2024-09-06 ENCOUNTER — TELEPHONE (OUTPATIENT)
Age: 64
End: 2024-09-06

## 2024-09-06 NOTE — TELEPHONE ENCOUNTER
Pt reports she has an issue swallowing medication. Pt wants to know should she bother picking these 2-medications( Boniva) -(Fosamax) up since that is the case.    PCP please call pt back to further advise.

## 2024-09-10 DIAGNOSIS — M81.0 AGE-RELATED OSTEOPOROSIS WITHOUT CURRENT PATHOLOGICAL FRACTURE: Primary | ICD-10-CM

## 2024-09-10 RX ORDER — IBANDRONATE SODIUM 150 MG/1
150 TABLET, FILM COATED ORAL
Qty: 1 TABLET | Refills: 5 | Status: SHIPPED | OUTPATIENT
Start: 2024-09-10

## 2024-10-23 NOTE — TELEPHONE ENCOUNTER
Reason for call:     Patient requested refill of Prolia through [facility] then to be delivered to the office for administration, Tomeka from [facility] state once they get the prescription they will contact the office to schedule delivery.        [x] Refill   [] Prior Auth  [] Other:     Office:   [x] PCP/Provider - PA: WHITLEY Peterson   [] Specialty/Provider -     Medication: Prolia    Dose/Frequency: 60 mg Q6M    Quantity: 1    Pharmacy: [facility]     Does the patient have enough for 3 days? Not sure   [] Yes   [] No - Send as HP to POD     MEDICATIONS  (STANDING):  buDESOnide    Inhalation Suspension 0.5 milliGRAM(s) Inhalation two times a day  buMETAnide 1 milliGRAM(s) Oral daily  influenza   Vaccine 0.5 milliLiter(s) IntraMuscular once  ipratropium    for Nebulization 500 MICROGram(s) Nebulizer every 6 hours  montelukast 10 milliGRAM(s) Oral daily  mupirocin 2% Nasal 1 Application(s) Both Nostrils two times a day  NIFEdipine XL 30 milliGRAM(s) Oral <User Schedule>  pantoprazole    Tablet 40 milliGRAM(s) Oral before breakfast  predniSONE   Tablet 5 milliGRAM(s) Oral daily  rivaroxaban 20 milliGRAM(s) Oral daily  sildenafil (REVATIO) 20 milliGRAM(s) Oral <User Schedule>  spironolactone 50 milliGRAM(s) Oral two times a day  Treprostinil SubQ Continous Infusion 51 NANOGram(s)/kG/Min 0.056 mL/Hr (0.06 mL/Hr) IV Continuous <Continuous>    MEDICATIONS  (PRN):  acetaminophen     Tablet .. 650 milliGRAM(s) Oral every 6 hours PRN Temp greater or equal to 38C (100.4F), Mild Pain (1 - 3)  acetaminophen 325 mG/butalbital 50 mG/caffeine 40 mG 1 Tablet(s) Oral every 8 hours PRN Mild Pain (1 - 3)  aluminum hydroxide/magnesium hydroxide/simethicone Suspension 30 milliLiter(s) Oral every 4 hours PRN Dyspepsia  hydrocortisone 1% Ointment 1 Application(s) Topical two times a day PRN Itching  SUMAtriptan 50 milliGRAM(s) Oral every 12 hours PRN Moderate to Severe Headache

## 2024-11-01 DIAGNOSIS — J30.2 SEASONAL ALLERGIES: ICD-10-CM

## 2024-11-04 RX ORDER — MONTELUKAST SODIUM 10 MG/1
10 TABLET ORAL
Qty: 30 TABLET | Refills: 2 | Status: SHIPPED | OUTPATIENT
Start: 2024-11-04

## 2025-01-31 ENCOUNTER — RA CDI HCC (OUTPATIENT)
Dept: OTHER | Facility: HOSPITAL | Age: 65
End: 2025-01-31

## 2025-02-07 ENCOUNTER — OFFICE VISIT (OUTPATIENT)
Dept: INTERNAL MEDICINE CLINIC | Facility: CLINIC | Age: 65
End: 2025-02-07
Payer: MEDICARE

## 2025-02-07 ENCOUNTER — APPOINTMENT (OUTPATIENT)
Dept: LAB | Facility: CLINIC | Age: 65
End: 2025-02-07
Payer: MEDICARE

## 2025-02-07 VITALS
HEIGHT: 66 IN | SYSTOLIC BLOOD PRESSURE: 124 MMHG | BODY MASS INDEX: 26.08 KG/M2 | WEIGHT: 162.25 LBS | TEMPERATURE: 97.4 F | DIASTOLIC BLOOD PRESSURE: 80 MMHG | OXYGEN SATURATION: 99 % | HEART RATE: 50 BPM

## 2025-02-07 DIAGNOSIS — Z13.1 SCREENING FOR DIABETES MELLITUS: ICD-10-CM

## 2025-02-07 DIAGNOSIS — Z00.00 MEDICARE ANNUAL WELLNESS VISIT, SUBSEQUENT: Primary | ICD-10-CM

## 2025-02-07 DIAGNOSIS — J30.2 SEASONAL ALLERGIES: ICD-10-CM

## 2025-02-07 DIAGNOSIS — M65.311 TRIGGER FINGER OF RIGHT THUMB: ICD-10-CM

## 2025-02-07 DIAGNOSIS — Z13.220 SCREENING, LIPID: ICD-10-CM

## 2025-02-07 DIAGNOSIS — Z13.29 SCREENING FOR THYROID DISORDER: ICD-10-CM

## 2025-02-07 DIAGNOSIS — Z12.31 ENCOUNTER FOR SCREENING MAMMOGRAM FOR BREAST CANCER: ICD-10-CM

## 2025-02-07 DIAGNOSIS — Z23 ENCOUNTER FOR IMMUNIZATION: ICD-10-CM

## 2025-02-07 DIAGNOSIS — Z12.11 SCREENING FOR COLORECTAL CANCER: ICD-10-CM

## 2025-02-07 DIAGNOSIS — E55.9 VITAMIN D DEFICIENCY: ICD-10-CM

## 2025-02-07 DIAGNOSIS — Z13.0 SCREENING FOR DEFICIENCY ANEMIA: ICD-10-CM

## 2025-02-07 DIAGNOSIS — Z12.12 SCREENING FOR COLORECTAL CANCER: ICD-10-CM

## 2025-02-07 LAB
25(OH)D3 SERPL-MCNC: 34 NG/ML (ref 30–100)
ALBUMIN SERPL BCG-MCNC: 4.5 G/DL (ref 3.5–5)
ALP SERPL-CCNC: 85 U/L (ref 34–104)
ALT SERPL W P-5'-P-CCNC: 22 U/L (ref 7–52)
ANION GAP SERPL CALCULATED.3IONS-SCNC: 7 MMOL/L (ref 4–13)
AST SERPL W P-5'-P-CCNC: 21 U/L (ref 13–39)
BASOPHILS # BLD AUTO: 0.06 THOUSANDS/ΜL (ref 0–0.1)
BASOPHILS NFR BLD AUTO: 1 % (ref 0–1)
BILIRUB SERPL-MCNC: 0.53 MG/DL (ref 0.2–1)
BUN SERPL-MCNC: 11 MG/DL (ref 5–25)
CALCIUM SERPL-MCNC: 9.3 MG/DL (ref 8.4–10.2)
CHLORIDE SERPL-SCNC: 103 MMOL/L (ref 96–108)
CHOLEST SERPL-MCNC: 203 MG/DL (ref ?–200)
CO2 SERPL-SCNC: 29 MMOL/L (ref 21–32)
CREAT SERPL-MCNC: 0.66 MG/DL (ref 0.6–1.3)
EOSINOPHIL # BLD AUTO: 0.06 THOUSAND/ΜL (ref 0–0.61)
EOSINOPHIL NFR BLD AUTO: 1 % (ref 0–6)
ERYTHROCYTE [DISTWIDTH] IN BLOOD BY AUTOMATED COUNT: 12.5 % (ref 11.6–15.1)
GFR SERPL CREATININE-BSD FRML MDRD: 92 ML/MIN/1.73SQ M
GLUCOSE P FAST SERPL-MCNC: 91 MG/DL (ref 65–99)
HCT VFR BLD AUTO: 36 % (ref 34.8–46.1)
HDLC SERPL-MCNC: 60 MG/DL
HGB BLD-MCNC: 12.3 G/DL (ref 11.5–15.4)
IMM GRANULOCYTES # BLD AUTO: 0.02 THOUSAND/UL (ref 0–0.2)
IMM GRANULOCYTES NFR BLD AUTO: 0 % (ref 0–2)
LDLC SERPL CALC-MCNC: 123 MG/DL (ref 0–100)
LYMPHOCYTES # BLD AUTO: 1.22 THOUSANDS/ΜL (ref 0.6–4.47)
LYMPHOCYTES NFR BLD AUTO: 23 % (ref 14–44)
MCH RBC QN AUTO: 30.5 PG (ref 26.8–34.3)
MCHC RBC AUTO-ENTMCNC: 34.2 G/DL (ref 31.4–37.4)
MCV RBC AUTO: 89 FL (ref 82–98)
MONOCYTES # BLD AUTO: 0.46 THOUSAND/ΜL (ref 0.17–1.22)
MONOCYTES NFR BLD AUTO: 9 % (ref 4–12)
NEUTROPHILS # BLD AUTO: 3.43 THOUSANDS/ΜL (ref 1.85–7.62)
NEUTS SEG NFR BLD AUTO: 66 % (ref 43–75)
NONHDLC SERPL-MCNC: 143 MG/DL
NRBC BLD AUTO-RTO: 0 /100 WBCS
PLATELET # BLD AUTO: 246 THOUSANDS/UL (ref 149–390)
PMV BLD AUTO: 10.3 FL (ref 8.9–12.7)
POTASSIUM SERPL-SCNC: 4 MMOL/L (ref 3.5–5.3)
PROT SERPL-MCNC: 7.4 G/DL (ref 6.4–8.4)
RBC # BLD AUTO: 4.03 MILLION/UL (ref 3.81–5.12)
SODIUM SERPL-SCNC: 139 MMOL/L (ref 135–147)
TRIGL SERPL-MCNC: 101 MG/DL (ref ?–150)
TSH SERPL DL<=0.05 MIU/L-ACNC: 3.23 UIU/ML (ref 0.45–4.5)
WBC # BLD AUTO: 5.25 THOUSAND/UL (ref 4.31–10.16)

## 2025-02-07 PROCEDURE — 85025 COMPLETE CBC W/AUTO DIFF WBC: CPT

## 2025-02-07 PROCEDURE — 82306 VITAMIN D 25 HYDROXY: CPT

## 2025-02-07 PROCEDURE — 80053 COMPREHEN METABOLIC PANEL: CPT

## 2025-02-07 PROCEDURE — 80061 LIPID PANEL: CPT

## 2025-02-07 PROCEDURE — 90662 IIV NO PRSV INCREASED AG IM: CPT

## 2025-02-07 PROCEDURE — 84443 ASSAY THYROID STIM HORMONE: CPT

## 2025-02-07 PROCEDURE — G0438 PPPS, INITIAL VISIT: HCPCS | Performed by: PHYSICIAN ASSISTANT

## 2025-02-07 PROCEDURE — G0008 ADMIN INFLUENZA VIRUS VAC: HCPCS

## 2025-02-07 PROCEDURE — 36415 COLL VENOUS BLD VENIPUNCTURE: CPT

## 2025-02-07 RX ORDER — MONTELUKAST SODIUM 10 MG/1
10 TABLET ORAL
Qty: 30 TABLET | Refills: 5 | Status: SHIPPED | OUTPATIENT
Start: 2025-02-07

## 2025-02-07 NOTE — PROGRESS NOTES
Name: Fara Burdick      : 1960      MRN: [id number]  Encounter Provider: Miguelina Peterson PA-C  Encounter Date: 2025   Encounter department: [facility]    Assessment & Plan  Encounter for screening mammogram for breast cancer    Orders:  •  Mammo screening bilateral w 3d and cad; Future    Encounter for immunization    Orders:  •  influenza vaccine, high-dose, PF 0.5 mL (Fluzone High Dose)    Screening for colorectal cancer    Orders:  •  Ambulatory Referral to Gastroenterology; Future    Screening for diabetes mellitus    Orders:  •  Comprehensive metabolic panel; Future    Screening for thyroid disorder    Orders:  •  TSH, 3rd generation; Future    Screening for deficiency anemia    Orders:  •  CBC and differential; Future    Screening, lipid    Orders:  •  Lipid panel; Future    Vitamin D deficiency    Orders:  •  Vitamin D 25 hydroxy; Future    Medicare annual wellness visit, subsequent         Trigger finger of right thumb  Advised to start Aleve as well as get trigger finger splint. Directions for use and possible side effects discussed and patient verbalized understanding of these.           Depression Screening and Follow-up Plan: Patient's depression screening was positive with a PHQ-9 score of 5.   Patient declines further evaluation by mental health professional and/or medications. Brief counseling provided. Will re-evaluate at next office visit.     Preventive health issues were discussed with patient, and age appropriate screening tests were ordered as noted in patient's After Visit Summary. Personalized health advice and appropriate referrals for health education or preventive services given if needed, as noted in patient's After Visit Summary.    History of Present Illness     Pt presents for routine visit. AWV performed. She relates issues with right sided trigger finger of her thumb. She has not tried anything to treat this.        Patient Care Team:  Miguelina  BERYL Peterson as PCP - General (Family Medicine)    Review of Systems   Constitutional:  Negative for chills and fever.   HENT:  Negative for congestion, ear pain, hearing loss, postnasal drip, rhinorrhea, sinus pressure, sinus pain, sore throat and trouble swallowing.    Eyes:  Negative for pain and visual disturbance.   Respiratory:  Negative for cough, chest tightness, shortness of breath and wheezing.    Cardiovascular: Negative.  Negative for chest pain, palpitations and leg swelling.   Gastrointestinal:  Negative for abdominal pain, blood in stool, constipation, diarrhea, nausea and vomiting.   Endocrine: Negative for cold intolerance, heat intolerance, polydipsia, polyphagia and polyuria.   Genitourinary:  Negative for difficulty urinating, dysuria, flank pain and urgency.   Musculoskeletal:  Negative for arthralgias, back pain, gait problem and myalgias.   Skin:  Negative for rash.   Allergic/Immunologic: Negative.    Neurological:  Negative for dizziness, weakness, light-headedness and headaches.   Hematological: Negative.    Psychiatric/Behavioral:  Negative for behavioral problems, dysphoric mood and sleep disturbance. The patient is not nervous/anxious.      Medical History Reviewed by provider this encounter:  Tobacco  Allergies  Meds  Problems  Med Hx  Surg Hx  Fam Hx       Annual Wellness Visit Questionnaire   Fara is here for her Subsequent Wellness visit. Last Medicare Wellness visit information reviewed, patient interviewed and updates made to the record today.      Health Risk Assessment:   Patient rates overall health as very good. Patient feels that their physical health rating is slightly better. Patient is satisfied with their life. Eyesight was rated as much worse. Hearing was rated as slightly better. Patient feels that their emotional and mental health rating is slightly worse. Patients states they are sometimes angry. Patient states they are often unusually tired/fatigued. Pain  experienced in the last 7 days has been none. Patient states that she has experienced no weight loss or gain in last 6 months.     Depression Screening:   PHQ-9 Score: 5      Fall Risk Screening:   In the past year, patient has experienced: no history of falling in past year      Urinary Incontinence Screening:   Patient has not leaked urine accidently in the last six months.     Home Safety:  Patient does not have trouble with stairs inside or outside of their home. Patient has working smoke alarms and has working carbon monoxide detector. Home safety hazards include: none.     Nutrition:   Current diet is Regular.     Medications:   Patient is not currently taking any over-the-counter supplements. Patient is able to manage medications.     Activities of Daily Living (ADLs)/Instrumental Activities of Daily Living (IADLs):   Walk and transfer into and out of bed and chair?: Yes  Dress and groom yourself?: Yes    Bathe or shower yourself?: Yes    Feed yourself? Yes  Do your laundry/housekeeping?: Yes  Manage your money, pay your bills and track your expenses?: Yes  Make your own meals?: Yes    Do your own shopping?: Yes    Advance Care Planning:   Living will: No    Durable POA for healthcare: No    Advanced directive: No    ACP document given: Yes      Cognitive Screening:   Provider or family/friend/caregiver concerned regarding cognition?: No    PREVENTIVE SCREENINGS      Cardiovascular Screening:    General: Screening Current      Diabetes Screening:     General: Screening Current      Colorectal Cancer Screening:       Due for: Colonoscopy - Low Risk      Breast Cancer Screening:     General: Screening Current      Cervical Cancer Screening:    General: Screening Not Indicated      Osteoporosis Screening:    General: Screening Not Indicated and History Osteoporosis      Lung Cancer Screening:     General: Screening Not Indicated      Hepatitis C Screening:    General: Screening Current    Screening, Brief  "Intervention, and Referral to Treatment (SBIRT)    Screening  Typical number of drinks in a day: 0  Typical number of drinks in a week: 0  Interpretation: Low risk drinking behavior.    Single Item Drug Screening:  How often have you used an illegal drug (including marijuana) or a prescription medication for non-medical reasons in the past year? never    Single Item Drug Screen Score: 0  Interpretation: Negative screen for possible drug use disorder    Social Drivers of Health     Food Insecurity: No Food Insecurity (2/7/2025)    Hunger Vital Sign    • Worried About Running Out of Food in the Last Year: Never true    • Ran Out of Food in the Last Year: Never true   Transportation Needs: No Transportation Needs (2/7/2025)    PRAPARE - Transportation    • Lack of Transportation (Medical): No    • Lack of Transportation (Non-Medical): No   Housing Stability: Low Risk  (2/7/2025)    Housing Stability Vital Sign    • Unable to Pay for Housing in the Last Year: No    • Number of Times Moved in the Last Year: 0    • Homeless in the Last Year: No   Utilities: Not At Risk (2/7/2025)    [facility] Utilities    • Threatened with loss of utilities: No     No results found.    Objective   /80 (BP Location: Left arm, Patient Position: Sitting)   Pulse (!) 50   Temp (!) 97.4 °F (36.3 °C) (Tympanic)   Ht 5' 5.5\" (1.664 m)   Wt 73.6 kg (162 lb 4 oz)   SpO2 99%   BMI 26.59 kg/m²     Physical Exam  Vitals and nursing note reviewed.   Constitutional:       Appearance: She is well-developed.   HENT:      Head: Normocephalic and atraumatic.      Right Ear: External ear normal.      Left Ear: External ear normal.      Nose: Nose normal.   Eyes:      Conjunctiva/sclera: Conjunctivae normal.   Cardiovascular:      Rate and Rhythm: Normal rate and regular rhythm.      Heart sounds: Normal heart sounds.   Pulmonary:      Effort: Pulmonary effort is normal.      Breath sounds: Normal breath sounds.   Abdominal:      General: Bowel sounds " are normal.      Palpations: Abdomen is soft.   Musculoskeletal:         General: Normal range of motion.      Cervical back: Normal range of motion and neck supple.   Skin:     General: Skin is warm and dry.   Neurological:      Mental Status: She is alert and oriented to person, place, and time.   Psychiatric:         Behavior: Behavior normal.         Thought Content: Thought content normal.         Judgment: Judgment normal.

## 2025-02-07 NOTE — PATIENT INSTRUCTIONS
Medicare Preventive Visit Patient Instructions  Thank you for completing your Welcome to Medicare Visit or Medicare Annual Wellness Visit today. Your next wellness visit will be due in one year (2/8/2026).  The screening/preventive services that you may require over the next 5-10 years are detailed below. Some tests may not apply to you based off risk factors and/or age. Screening tests ordered at today's visit but not completed yet may show as past due. Also, please note that scanned in results may not display below.  Preventive Screenings:  Service Recommendations Previous Testing/Comments   Colorectal Cancer Screening  * Colonoscopy    * Fecal Occult Blood Test (FOBT)/Fecal Immunochemical Test (FIT)  * Fecal DNA/Cologuard Test  * Flexible Sigmoidoscopy Age: 45-75 years old   Colonoscopy: every 10 years (may be performed more frequently if at higher risk)  OR  FOBT/FIT: every 1 year  OR  Cologuard: every 3 years  OR  Sigmoidoscopy: every 5 years  Screening may be recommended earlier than age 45 if at higher risk for colorectal cancer. Also, an individualized decision between you and your healthcare provider will decide whether screening between the ages of 76-85 would be appropriate. Colonoscopy: Not on file  FOBT/FIT: Not on file  Cologuard: Not on file  Sigmoidoscopy: Not on file          Breast Cancer Screening Age: 40+ years old  Frequency: every 1-2 years  Not required if history of left and right mastectomy Mammogram: 06/15/2023    Screening Current   Cervical Cancer Screening Between the ages of 21-29, pap smear recommended once every 3 years.   Between the ages of 30-65, can perform pap smear with HPV co-testing every 5 years.   Recommendations may differ for women with a history of total hysterectomy, cervical cancer, or abnormal pap smears in past. Pap Smear: Not on file    Screening Not Indicated   Hepatitis C Screening Once for adults born between 1945 and 1965  More frequently in patients at high risk  for Hepatitis C Hep C Antibody: 04/18/2023    Screening Current   Diabetes Screening 1-2 times per year if you're at risk for diabetes or have pre-diabetes Fasting glucose: 88 mg/dL (4/18/2023)  A1C: No results in last 5 years (No results in last 5 years)  Screening Current   Cholesterol Screening Once every 5 years if you don't have a lipid disorder. May order more often based on risk factors. Lipid panel: 04/18/2023    Screening Current     Other Preventive Screenings Covered by Medicare:  Abdominal Aortic Aneurysm (AAA) Screening: covered once if your at risk. You're considered to be at risk if you have a family history of AAA.  Lung Cancer Screening: covers low dose CT scan once per year if you meet all of the following conditions: (1) Age 55-77; (2) No signs or symptoms of lung cancer; (3) Current smoker or have quit smoking within the last 15 years; (4) You have a tobacco smoking history of at least 20 pack years (packs per day multiplied by number of years you smoked); (5) You get a written order from a healthcare provider.  Glaucoma Screening: covered annually if you're considered high risk: (1) You have diabetes OR (2) Family history of glaucoma OR (3)  aged 50 and older OR (4)  American aged 65 and older  Osteoporosis Screening: covered every 2 years if you meet one of the following conditions: (1) You're estrogen deficient and at risk for osteoporosis based off medical history and other findings; (2) Have a vertebral abnormality; (3) On glucocorticoid therapy for more than 3 months; (4) Have primary hyperparathyroidism; (5) On osteoporosis medications and need to assess response to drug therapy.   Last bone density test (DXA Scan): 06/15/2023.  HIV Screening: covered annually if you're between the age of 15-65. Also covered annually if you are younger than 15 and older than 65 with risk factors for HIV infection. For pregnant patients, it is covered up to 3 times per  pregnancy.    Immunizations:  Immunization Recommendations   Influenza Vaccine Annual influenza vaccination during flu season is recommended for all persons aged >= 6 months who do not have contraindications   Pneumococcal Vaccine   * Pneumococcal conjugate vaccine = PCV13 (Prevnar 13), PCV15 (Vaxneuvance), PCV20 (Prevnar 20)  * Pneumococcal polysaccharide vaccine = PPSV23 (Pneumovax) Adults 19-65 yo with certain risk factors or if 65+ yo  If never received any pneumonia vaccine: recommend Prevnar 20 (PCV20)  Give PCV20 if previously received 1 dose of PCV13 or PPSV23   Hepatitis B Vaccine 3 dose series if at intermediate or high risk (ex: diabetes, end stage renal disease, liver disease)   Respiratory syncytial virus (RSV) Vaccine - COVERED BY MEDICARE PART D  * RSVPreF3 (Arexvy) CDC recommends that adults 60 years of age and older may receive a single dose of RSV vaccine using shared clinical decision-making (SCDM)   Tetanus (Td) Vaccine - COST NOT COVERED BY MEDICARE PART B Following completion of primary series, a booster dose should be given every 10 years to maintain immunity against tetanus. Td may also be given as tetanus wound prophylaxis.   Tdap Vaccine - COST NOT COVERED BY MEDICARE PART B Recommended at least once for all adults. For pregnant patients, recommended with each pregnancy.   Shingles Vaccine (Shingrix) - COST NOT COVERED BY MEDICARE PART B  2 shot series recommended in those 19 years and older who have or will have weakened immune systems or those 50 years and older     Health Maintenance Due:      Topic Date Due   • Colorectal Cancer Screening  Never done   • Breast Cancer Screening: Mammogram  06/15/2024   • Hepatitis C Screening  Completed     Immunizations Due:      Topic Date Due   • Influenza Vaccine (1) 09/01/2024   • COVID-19 Vaccine (3 - 2024-25 season) 09/01/2024     Advance Directives   What are advance directives?  Advance directives are legal documents that state your wishes and  plans for medical care. These plans are made ahead of time in case you lose your ability to make decisions for yourself. Advance directives can apply to any medical decision, such as the treatments you want, and if you want to donate organs.   What are the types of advance directives?  There are many types of advance directives, and each state has rules about how to use them. You may choose a combination of any of the following:  Living will:  This is a written record of the treatment you want. You can also choose which treatments you do not want, which to limit, and which to stop at a certain time. This includes surgery, medicine, IV fluid, and tube feedings.   Durable power of  for healthcare (DPAHC):  This is a written record that states who you want to make healthcare choices for you when you are unable to make them for yourself. This person, called a proxy, is usually a family member or a friend. You may choose more than 1 proxy.  Do not resuscitate (DNR) order:  A DNR order is used in case your heart stops beating or you stop breathing. It is a request not to have certain forms of treatment, such as CPR. A DNR order may be included in other types of advance directives.  Medical directive:  This covers the care that you want if you are in a coma, near death, or unable to make decisions for yourself. You can list the treatments you want for each condition. Treatment may include pain medicine, surgery, blood transfusions, dialysis, IV or tube feedings, and a ventilator (breathing machine).  Values history:  This document has questions about your views, beliefs, and how you feel and think about life. This information can help others choose the care that you would choose.  Why are advance directives important?  An advance directive helps you control your care. Although spoken wishes may be used, it is better to have your wishes written down. Spoken wishes can be misunderstood, or not followed. Treatments  may be given even if you do not want them. An advance directive may make it easier for your family to make difficult choices about your care.   Urinary Incontinence   Urinary incontinence (UI)  is when you lose control of your bladder. UI develops because your bladder cannot store or empty urine properly. The 3 most common types of UI are stress incontinence, urge incontinence, or both.  Medicines:   May be given to help strengthen your bladder control. Report any side effects of medication to your healthcare provider.  Do pelvic muscle exercises often:  Your pelvic muscles help you stop urinating. Squeeze these muscles tight for 5 seconds, then relax for 5 seconds. Gradually work up to squeezing for 10 seconds. Do 3 sets of 15 repetitions a day, or as directed. This will help strengthen your pelvic muscles and improve bladder control.  Train your bladder:  Go to the bathroom at set times, such as every 2 hours, even if you do not feel the urge to go. You can also try to hold your urine when you feel the urge to go. For example, hold your urine for 5 minutes when you feel the urge to go. As that becomes easier, hold your urine for 10 minutes.   Self-care:   Keep a UI record.  Write down how often you leak urine and how much you leak. Make a note of what you were doing when you leaked urine.  Drink liquids as directed. You may need to limit the amount of liquid you drink to help control your urine leakage. Do not drink any liquid right before you go to bed. Limit or do not have drinks that contain caffeine or alcohol.   Prevent constipation.  Eat a variety of high-fiber foods. Good examples are high-fiber cereals, beans, vegetables, and whole-grain breads. Walking is the best way to trigger your intestines to have a bowel movement.  Exercise regularly and maintain a healthy weight.  Weight loss and exercise will decrease pressure on your bladder and help you control your leakage.   Use a catheter as directed  to help  empty your bladder. A catheter is a tiny, plastic tube that is put into your bladder to drain your urine.   Go to behavior therapy as directed.  Behavior therapy may be used to help you learn to control your urge to urinate.    Weight Management   Why it is important to manage your weight:  Being overweight increases your risk of health conditions such as heart disease, high blood pressure, type 2 diabetes, and certain types of cancer. It can also increase your risk for osteoarthritis, sleep apnea, and other respiratory problems. Aim for a slow, steady weight loss. Even a small amount of weight loss can lower your risk of health problems.  How to lose weight safely:  A safe and healthy way to lose weight is to eat fewer calories and get regular exercise. You can lose up about 1 pound a week by decreasing the number of calories you eat by 500 calories each day.   Healthy meal plan for weight management:  A healthy meal plan includes a variety of foods, contains fewer calories, and helps you stay healthy. A healthy meal plan includes the following:  Eat whole-grain foods more often.  A healthy meal plan should contain fiber. Fiber is the part of grains, fruits, and vegetables that is not broken down by your body. Whole-grain foods are healthy and provide extra fiber in your diet. Some examples of whole-grain foods are whole-wheat breads and pastas, oatmeal, brown rice, and bulgur.  Eat a variety of vegetables every day.  Include dark, leafy greens such as spinach, kale, atiya greens, and mustard greens. Eat yellow and orange vegetables such as carrots, sweet potatoes, and winter squash.   Eat a variety of fruits every day.  Choose fresh or canned fruit (canned in its own juice or light syrup) instead of juice. Fruit juice has very little or no fiber.  Eat low-fat dairy foods.  Drink fat-free (skim) milk or 1% milk. Eat fat-free yogurt and low-fat cottage cheese. Try low-fat cheeses such as mozzarella and other  reduced-fat cheeses.  Choose meat and other protein foods that are low in fat.  Choose beans or other legumes such as split peas or lentils. Choose fish, skinless poultry (chicken or turkey), or lean cuts of red meat (beef or pork). Before you cook meat or poultry, cut off any visible fat.   Use less fat and oil.  Try baking foods instead of frying them. Add less fat, such as margarine, sour cream, regular salad dressing and mayonnaise to foods. Eat fewer high-fat foods. Some examples of high-fat foods include french fries, doughnuts, ice cream, and cakes.  Eat fewer sweets.  Limit foods and drinks that are high in sugar. This includes candy, cookies, regular soda, and sweetened drinks.  Exercise:  Exercise at least 30 minutes per day on most days of the week. Some examples of exercise include walking, biking, dancing, and swimming. You can also fit in more physical activity by taking the stairs instead of the elevator or parking farther away from stores. Ask your healthcare provider about the best exercise plan for you.      © Copyright [employer] 2018 Information is for End User's use only and may not be sold, redistributed or otherwise used for commercial purposes. All illustrations and images included in CareNotes® are the copyrighted property of [employer] Inc[employer] or [employer]

## 2025-02-07 NOTE — ASSESSMENT & PLAN NOTE
Advised to start Aleve as well as get trigger finger splint. Directions for use and possible side effects discussed and patient verbalized understanding of these.

## 2025-02-12 ENCOUNTER — RESULTS FOLLOW-UP (OUTPATIENT)
Dept: INTERNAL MEDICINE CLINIC | Facility: CLINIC | Age: 65
End: 2025-02-12

## 2025-03-12 ENCOUNTER — HOSPITAL ENCOUNTER (OUTPATIENT)
Dept: MAMMOGRAPHY | Facility: HOSPITAL | Age: 65
Discharge: HOME | End: 2025-03-12
Payer: MEDICARE

## 2025-03-12 DIAGNOSIS — M81.0 AGE-RELATED OSTEOPOROSIS WITHOUT CURRENT PATHOLOGICAL FRACTURE: ICD-10-CM

## 2025-03-12 DIAGNOSIS — Z12.31 ENCOUNTER FOR SCREENING MAMMOGRAM FOR BREAST CANCER: ICD-10-CM

## 2025-03-12 PROCEDURE — 77063 BREAST TOMOSYNTHESIS BI: CPT

## 2025-03-12 PROCEDURE — 77067 SCR MAMMO BI INCL CAD: CPT

## 2025-03-13 ENCOUNTER — TELEPHONE (OUTPATIENT)
Dept: INTERNAL MEDICINE CLINIC | Facility: CLINIC | Age: 65
End: 2025-03-13

## 2025-03-13 DIAGNOSIS — M65.30 TRIGGER FINGER, UNSPECIFIED FINGER, UNSPECIFIED LATERALITY: Primary | ICD-10-CM

## 2025-03-13 RX ORDER — IBANDRONATE SODIUM 150 MG/1
TABLET, FILM COATED ORAL
Qty: 1 TABLET | Refills: 5 | Status: SHIPPED | OUTPATIENT
Start: 2025-03-13

## 2025-03-13 NOTE — TELEPHONE ENCOUNTER
Would she like to see one of our hand specialists, they can easily inject it to relieve her symptoms

## 2025-03-13 NOTE — TELEPHONE ENCOUNTER
Reason for call:   [x] Refill   [] Prior Auth  [] Other:     Office:   [x] PCP/Provider -   [] Specialty/Provider -     Medication: ibandronate (BONIVA) 150 MG tablet     Dose/Frequency: Take 1 tablet (150 mg total) by mouth every 30 (thirty) days     Quantity: 1    Pharmacy: [facility] [address] [place] [address] - [address] [place] [address] Pharmacy   Does the patient have enough for 3 days?   [] Yes   [x] No - Send as HP to POD    Mail Away Pharmacy   Does the patient have enough for 10 days?   [] Yes   [] No - Send as HP to POD

## 2025-03-13 NOTE — TELEPHONE ENCOUNTER
Patient returned call. Advised of Miguelina Peterson's message.     Patient would like a referral for a hand specialist.     Please notify once order is done

## 2025-03-26 ENCOUNTER — OFFICE VISIT (OUTPATIENT)
Dept: OBGYN CLINIC | Facility: CLINIC | Age: 65
End: 2025-03-26
Payer: MEDICARE

## 2025-03-26 VITALS — HEIGHT: 66 IN | WEIGHT: 162 LBS | BODY MASS INDEX: 26.03 KG/M2

## 2025-03-26 DIAGNOSIS — M65.311 TRIGGER THUMB OF RIGHT HAND: Primary | ICD-10-CM

## 2025-03-26 DIAGNOSIS — M65.30 TRIGGER FINGER, UNSPECIFIED FINGER, UNSPECIFIED LATERALITY: ICD-10-CM

## 2025-03-26 PROCEDURE — 99203 OFFICE O/P NEW LOW 30 MIN: CPT | Performed by: SURGERY

## 2025-03-26 PROCEDURE — 20550 NJX 1 TENDON SHEATH/LIGAMENT: CPT | Performed by: PHYSICIAN ASSISTANT

## 2025-03-26 RX ORDER — BETAMETHASONE SODIUM PHOSPHATE AND BETAMETHASONE ACETATE 3; 3 MG/ML; MG/ML
3 INJECTION, SUSPENSION INTRA-ARTICULAR; INTRALESIONAL; INTRAMUSCULAR; SOFT TISSUE
Status: COMPLETED | OUTPATIENT
Start: 2025-03-26 | End: 2025-03-26

## 2025-03-26 RX ORDER — LIDOCAINE HYDROCHLORIDE 10 MG/ML
2.5 INJECTION, SOLUTION INFILTRATION; PERINEURAL
Status: COMPLETED | OUTPATIENT
Start: 2025-03-26 | End: 2025-03-26

## 2025-03-26 RX ADMIN — BETAMETHASONE SODIUM PHOSPHATE AND BETAMETHASONE ACETATE 3 MG: 3; 3 INJECTION, SUSPENSION INTRA-ARTICULAR; INTRALESIONAL; INTRAMUSCULAR; SOFT TISSUE at 08:30

## 2025-03-26 RX ADMIN — LIDOCAINE HYDROCHLORIDE 2.5 ML: 10 INJECTION, SOLUTION INFILTRATION; PERINEURAL at 08:30

## 2025-03-26 NOTE — PROGRESS NOTES
Name: Fara Burdick      : 1960      MRN: [id number]  Encounter Provider: Eddy Tesfaye MD  Encounter Date: 3/26/2025   Encounter department: [facility]      Assessment & Plan  Trigger thumb of right hand  Steroid injection with provided today and the patient tolerated well.  Activities as tolerated.  May follow-up as needed.  Recommend calling the office if symptoms persist or return in the future.  All questions answered.            History of Present Illness   Patient is a 65 y.o. female here for evaluation of the right thumb.  According to the patient, she has roughly a few month history of right thumb pain with associated locking and freezing.  There is no injury or trauma to the area.  She did see PCP who recommended 2 weeks of Aleve consistently which did not help.  There is no associated numbness and tingling in the finger.  No history of surgery to the right thumb.  Patient is Right Handed.      Review of Systems A 10 point ROS was performed; negative except as noted above.     Objective     Imaging  No new imaging today     Physical Exam Right thumb  Inspection: No open wounds or erythema.  No ecchymosis or swelling.  No bony abnormality.    Palpation: Tender to palpation A1 pulley with palpable nodule    Range-of-motion: Crepitus and clicking with thumb range of motion.  + active locking.    Strength:  Limited 2/2 locking.    Sensation:  ILT m/r/u nerve distributions.    Special Tests:  Palpable radial pulse  UE warm and well perfused.    Hand/upper extremity injection: R thumb A1  Universal Protocol:  Consent: Verbal consent obtained.  Risks and benefits: risks, benefits and alternatives were discussed  Consent given by: patient  Patient identity confirmed: verbally with patient  Supporting Documentation  Indications: pain and tendon swelling   Procedure Details  Condition:trigger finger Location: thumb - R thumb A1   Preparation: Patient was prepped and  draped in the usual sterile fashion  Needle size: 22 G  Ultrasound guidance: no  Medications administered: 2.5 mL lidocaine 1 %; 3 mg betamethasone acetate-betamethasone sodium phosphate 6 (3-3) mg/mL  Patient tolerance: patient tolerated the procedure well with no immediate complications  Dressing:  Sterile dressing applied             Randolph Ramos PA-C

## 2025-06-04 ENCOUNTER — CONSULT (OUTPATIENT)
Dept: INTERNAL MEDICINE CLINIC | Facility: CLINIC | Age: 65
End: 2025-06-04
Payer: MEDICARE

## 2025-06-04 VITALS
BODY MASS INDEX: 25.71 KG/M2 | HEART RATE: 70 BPM | DIASTOLIC BLOOD PRESSURE: 74 MMHG | OXYGEN SATURATION: 98 % | HEIGHT: 66 IN | WEIGHT: 160 LBS | SYSTOLIC BLOOD PRESSURE: 122 MMHG

## 2025-06-04 DIAGNOSIS — Z01.818 PRE-OP EXAMINATION: Primary | ICD-10-CM

## 2025-06-04 DIAGNOSIS — Z12.12 SCREENING FOR COLORECTAL CANCER: ICD-10-CM

## 2025-06-04 DIAGNOSIS — Z12.11 SCREENING FOR COLORECTAL CANCER: ICD-10-CM

## 2025-06-04 DIAGNOSIS — H25.9 AGE-RELATED CATARACT OF BOTH EYES, UNSPECIFIED AGE-RELATED CATARACT TYPE: ICD-10-CM

## 2025-06-04 PROCEDURE — 99213 OFFICE O/P EST LOW 20 MIN: CPT | Performed by: INTERNAL MEDICINE

## 2025-06-04 NOTE — PROGRESS NOTES
Name: Fara Burdick      : 1960      MRN: [id number]  Encounter Provider: Basil Ma DO  Encounter Date: 2025   Encounter department: [facility]  :  Assessment & Plan  Screening for colorectal cancer         Pre-op examination         Age-related cataract of both eyes, unspecified age-related cataract type         A/P: PAT tests Not requested,but recent routine labs ok... Pt and co-morbidities are stable. Pt is a Harden's Class I and carries a cardiac risk of about 1%. Recommend holding any ASA, NSAID's, omega 3, and MVT one week prior to the procedure. On the day of sx, may take her singulair and omeprazole with a sip of water. Resume her other meds once taking PO. Pt to remove her dentures on the day of sx.  No other recs at this time. Cleared for sx. Thanks and good luck.        History of Present Illness   WF presents at the request of Dr. Verdugo for pre-op eval for upcoming OU cataract extraction  tentatively scheduled for OD 25 and OS 25.. Since last visit, doing well and no recent illnesses. Remains active w/o difficulty and no falls. No travel history. Denies depression. No recent illnesses. No fever, chills, or sweats. No unexplained wt changes. Denies CP, SOB, or palpitations. No edema. No orthopnea or PND. No sz or syncope. No changes in bowel or bladder habits. PMH includes MDD, GERD, and allergies.. . Past sx include tubal, bladder suspension, colposcopy and reports no problems with prior procedures or anesthesia. Denies  smoking and rare ETOH use. No history of DVT or PE. NO history of bleeding issues and is on NSAID's, but not on anti-coagulants. Positive for  dental plates. Denies C spine issues. No objections to getting blood products if deemed necessary. No PAT testing done.          Review of Systems   Constitutional:  Negative for activity change, chills, diaphoresis, fatigue and fever.   HENT: Negative.     Eyes:  Positive for visual disturbance.  "  Respiratory:  Negative for cough, chest tightness, shortness of breath and wheezing.    Cardiovascular:  Negative for chest pain, palpitations and leg swelling.   Gastrointestinal:  Negative for abdominal pain, constipation, diarrhea, nausea and vomiting.   Endocrine: Negative for cold intolerance and heat intolerance.   Genitourinary:  Negative for difficulty urinating, dysuria and frequency.   Musculoskeletal:  Negative for arthralgias, gait problem and myalgias.   Neurological:  Negative for dizziness, seizures, syncope, weakness, light-headedness and headaches.   Psychiatric/Behavioral:  Negative for confusion, dysphoric mood and sleep disturbance. The patient is not nervous/anxious.        Objective   /74   Pulse 70   Ht 5' 5.5\" (1.664 m)   Wt 72.6 kg (160 lb)   SpO2 98%   BMI 26.22 kg/m²      Physical Exam  Vitals and nursing note reviewed.   Constitutional:       General: She is not in acute distress.     Appearance: Normal appearance. She is not ill-appearing.   HENT:      Head: Normocephalic and atraumatic.      Comments: No oropharyngeal obstruction. Dentures noted.      Mouth/Throat:      Mouth: Mucous membranes are moist.     Eyes:      Extraocular Movements: Extraocular movements intact.      Conjunctiva/sclera: Conjunctivae normal.      Pupils: Pupils are equal, round, and reactive to light.     Neck:      Vascular: No carotid bruit.      Comments: No C Spine restrictions.   Cardiovascular:      Rate and Rhythm: Normal rate and regular rhythm.      Heart sounds: Normal heart sounds. No murmur heard.  Pulmonary:      Effort: Pulmonary effort is normal. No respiratory distress.      Breath sounds: Normal breath sounds. No wheezing, rhonchi or rales.   Abdominal:      General: There is no distension.      Palpations: Abdomen is soft.      Tenderness: There is no abdominal tenderness.     Musculoskeletal:      Cervical back: Normal range of motion and neck supple. No rigidity or tenderness.    "   Right lower leg: No edema.      Left lower leg: No edema.   Lymphadenopathy:      Cervical: No cervical adenopathy.     Neurological:      General: No focal deficit present.      Mental Status: She is alert and oriented to person, place, and time. Mental status is at baseline.     Psychiatric:         Mood and Affect: Mood normal.         Behavior: Behavior normal.         Thought Content: Thought content normal.         Judgment: Judgment normal.

## (undated) DEVICE — BAG DECANTER

## (undated) DEVICE — PREMIUM DRY TRAY LF: Brand: MEDLINE INDUSTRIES, INC.

## (undated) DEVICE — SUT PDS II 2-0 CT-2 27 IN Z333H

## (undated) DEVICE — TUBING SUCTION 5MM X 12 FT

## (undated) DEVICE — FLOSEAL HEMOSTATIC MATRIX, 10 ML: Brand: FLOSEAL

## (undated) DEVICE — CAUTERY TIP POLISHER: Brand: DEVON

## (undated) DEVICE — SUT VICRYL 2-0 SH 27 IN UNDYED J417H

## (undated) DEVICE — EXIDINE 4 PCT

## (undated) DEVICE — BULB SYRINGE,IRRIGATION WITH PROTECTIVE CAP: Brand: DOVER

## (undated) DEVICE — SUT PROLENE 0 CT-2 30 IN 8412H

## (undated) DEVICE — SUT PDS II 3-0 SH 27 IN Z316H

## (undated) DEVICE — IV FLUSH NSS 10ML POSIFLUSH

## (undated) DEVICE — 2000CC GUARDIAN II: Brand: GUARDIAN

## (undated) DEVICE — CATH FOLEY 18FR 5ML 2 WAY UNCOATED SILICONE

## (undated) DEVICE — ADHESIVE SKN CLSR HISTOACRYL FLEX 0.5ML LF

## (undated) DEVICE — MEDI-VAC YANKAUER SUCTION HANDLE W/BULBOUS AND CONTROL VENT: Brand: CARDINAL HEALTH

## (undated) DEVICE — NEEDLE HYPO 22G X 1-1/2 IN

## (undated) DEVICE — ELECTROSURGICAL DEVICE HOLSTER;FOR USE WITH MAXIMUM PEAK VOLTAGE OF 4000 V: Brand: FORCE TRIVERSE

## (undated) DEVICE — CURITY PLAIN PACKING STRIP: Brand: CURITY

## (undated) DEVICE — 3M™ STERI-DRAPE™ UNDER BUTTOCKS DRAPE WITH POUCH 1084: Brand: STERI-DRAPE™

## (undated) DEVICE — SMOKE EVACUATION TUBING WITH 8 IN INTEGRAL WAND AND SPONGE GUARD: Brand: BUFFALO FILTER

## (undated) DEVICE — SPONGE 4 X 4 XRAY 16 PLY STRL LF RFD

## (undated) DEVICE — NEEDLE EPID TUOHY 18G X 3.5IN  WNG CLR LF

## (undated) DEVICE — DRAPE FLUID WARMER (BIRD BATH)

## (undated) DEVICE — BAG URINE DRAINAGE 2000ML ANTI RFLX LF

## (undated) DEVICE — INTENDED FOR TISSUE SEPARATION, AND OTHER PROCEDURES THAT REQUIRE A SHARP SURGICAL BLADE TO PUNCTURE OR CUT.: Brand: BARD-PARKER SAFETY BLADES SIZE 11, STERILE

## (undated) DEVICE — DRAPE EQUIPMENT RF WAND

## (undated) DEVICE — SUT VICRYL 0 CT-1 36 IN J946H

## (undated) DEVICE — ALLENTOWN DR  LUCENTE S LAP PK: Brand: CARDINAL HEALTH

## (undated) DEVICE — SCD SEQUENTIAL COMPRESSION COMFORT SLEEVE MEDIUM KNEE LENGTH: Brand: KENDALL SCD

## (undated) DEVICE — GLOVE PI ULTRA TOUCH SZ.8.0